# Patient Record
Sex: MALE | Race: BLACK OR AFRICAN AMERICAN | NOT HISPANIC OR LATINO | Employment: FULL TIME | URBAN - METROPOLITAN AREA
[De-identification: names, ages, dates, MRNs, and addresses within clinical notes are randomized per-mention and may not be internally consistent; named-entity substitution may affect disease eponyms.]

---

## 2021-01-19 ENCOUNTER — MEDICAL CORRESPONDENCE (OUTPATIENT)
Dept: HEALTH INFORMATION MANAGEMENT | Facility: CLINIC | Age: 31
End: 2021-01-19

## 2021-01-19 ENCOUNTER — TRANSFERRED RECORDS (OUTPATIENT)
Dept: HEALTH INFORMATION MANAGEMENT | Facility: CLINIC | Age: 31
End: 2021-01-19

## 2021-01-21 ENCOUNTER — MEDICAL CORRESPONDENCE (OUTPATIENT)
Dept: HEALTH INFORMATION MANAGEMENT | Facility: CLINIC | Age: 31
End: 2021-01-21

## 2021-01-21 ENCOUNTER — TRANSCRIBE ORDERS (OUTPATIENT)
Dept: OTHER | Age: 31
End: 2021-01-21

## 2021-01-21 DIAGNOSIS — H10.13 ALLERGIC CONJUNCTIVITIS, BILATERAL: Primary | ICD-10-CM

## 2021-01-21 DIAGNOSIS — H40.053 BORDERLINE GLAUCOMA WITH OCULAR HYPERTENSION, BILATERAL: ICD-10-CM

## 2021-01-22 ENCOUNTER — APPOINTMENT (OUTPATIENT)
Dept: INTERPRETER SERVICES | Facility: CLINIC | Age: 31
End: 2021-01-22

## 2021-03-30 ENCOUNTER — TELEPHONE (OUTPATIENT)
Dept: OPHTHALMOLOGY | Facility: CLINIC | Age: 31
End: 2021-03-30

## 2021-03-30 NOTE — TELEPHONE ENCOUNTER
[phone] home/cell    Left message with  at [phone]    Reviewed not able to use personal email.    Provided clinic address/building name and floor and direct number for questions-- with     Mathew Magallanes RN 1:28 PM 03/30/21          [facility]    Phone Message    May a detailed message be left on voicemail: yes     Reason for Call: Other: Please send an e-mail to the Pt (address updated) with the Clinic Address. Please send ASAP as Appt is tomorrow (03/31/2021). Thanks!     Action Taken: Message routed to:  Other: [facility]    Travel Screening: Not Applicable

## 2021-03-31 ENCOUNTER — OFFICE VISIT (OUTPATIENT)
Dept: OPHTHALMOLOGY | Facility: CLINIC | Age: 31
End: 2021-03-31
Attending: FAMILY MEDICINE
Payer: COMMERCIAL

## 2021-03-31 DIAGNOSIS — H02.59 FLOPPY EYELID SYNDROME OF LEFT EYE: Primary | ICD-10-CM

## 2021-03-31 DIAGNOSIS — H40.053 BORDERLINE GLAUCOMA OF BOTH EYES WITH OCULAR HYPERTENSION: ICD-10-CM

## 2021-03-31 DIAGNOSIS — H40.003 GLAUCOMA SUSPECT OF BOTH EYES: ICD-10-CM

## 2021-03-31 PROCEDURE — 92133 CPTRZD OPH DX IMG PST SGM ON: CPT | Mod: 26 | Performed by: OPHTHALMOLOGY

## 2021-03-31 PROCEDURE — 92133 CPTRZD OPH DX IMG PST SGM ON: CPT | Performed by: STUDENT IN AN ORGANIZED HEALTH CARE EDUCATION/TRAINING PROGRAM

## 2021-03-31 PROCEDURE — 92004 COMPRE OPH EXAM NEW PT 1/>: CPT | Mod: GC | Performed by: OPHTHALMOLOGY

## 2021-03-31 PROCEDURE — G0463 HOSPITAL OUTPT CLINIC VISIT: HCPCS

## 2021-03-31 ASSESSMENT — CONF VISUAL FIELD
METHOD: COUNTING FINGERS
OD_NORMAL: 1
OS_NORMAL: 1

## 2021-03-31 ASSESSMENT — TONOMETRY
OD_IOP_MMHG: 29
OS_IOP_MMHG: 21
IOP_METHOD: APPLANATION

## 2021-03-31 ASSESSMENT — VISUAL ACUITY
OD_SC: J1+
OS_SC: 20/20
OS_SC: J1+
OD_SC: 20/20
METHOD: SNELLEN - LINEAR

## 2021-03-31 ASSESSMENT — PACHYMETRY
OS_CT(UM): 545
OD_CT(UM): 514

## 2021-03-31 ASSESSMENT — EXTERNAL EXAM - LEFT EYE: OS_EXAM: NORMAL

## 2021-03-31 ASSESSMENT — EXTERNAL EXAM - RIGHT EYE: OD_EXAM: NORMAL

## 2021-03-31 ASSESSMENT — CUP TO DISC RATIO
OD_RATIO: 0.7
OS_RATIO: 0.65

## 2021-03-31 NOTE — NURSING NOTE
Chief Complaints and History of Present Illnesses   Patient presents with     Eye Problem Left Eye     Chief Complaint(s) and History of Present Illness(es)     Eye Problem Left Eye     Laterality: left eye    Frequency: constantly    Timing: throughout the day    Course: stable    Associated symptoms: Negative for redness, eye pain, dryness, photophobia, burning and itching    Pain scale: 0/10              Comments     Raised tissue inside LLL for nine months, progressing in size. Denies ocular discomfort.  Pt states he has never worn glasses. Pt states his VA seems unaffected by tissue growth.  HPI very difficult to obtain with  does not seem to understand pt and myself.  Pt states taking an eye drop medication three times a day to LE only.  Pt states he has noted no difference in tissue appearance since starting eye drops.  Marisa Brito, COT COT 2:27 PM 03/31/2021

## 2021-03-31 NOTE — PROGRESS NOTES
HPI     Eye Problem Left Eye     In left eye.  Occurring constantly.  It is worse throughout the day.  Since onset it is stable.  Associated symptoms include Negative for redness, eye pain, dryness, photophobia, burning and itching.  Pain was noted as 0/10.              Comments     Raised tissue inside LLL for nine months, progressing in size. Denies ocular discomfort.  Pt states he has never worn glasses. Pt states his VA seems unaffected by tissue growth.  HPI very difficult to obtain with  does not seem to understand pt and myself.  Pt states taking an eye drop medication three times a day to LE only.  Pt states he has noted no difference in tissue appearance since starting eye drops.  Marisa Brito COT COT 2:27 PM 03/31/2021              Last edited by Marisa Brito COT on 3/31/2021  2:28 PM. (History)        POH: Ocular HTN  GTTs: Currently using a drop TID left eye (does not recall name)  PMH: Negative  FH: Mother with eye condition but does not know what     OCT RNFL (3/31/2021)  OD: , all green  OS: GT 99, all green    Assessment & Plan    1. 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 Sleeps on left side. Exam with lid laxity OS>OD   - Start lubricating gel/ointment QHS   - Artificial tears PRN    2. Glaucoma Suspect OU   - Based on C/D ratio and hx of ocular HTN   - Denies known family hx of glaucoma   - IOP 29/24 today   - Pachy 515/545 (on 3/31/21)   - OCT RNFL (3/31/21) - normal OU   - Discussed with patient today. Will recheck IOP and get baseline VF in 3-4 months. If IOP remains elevated and based on testing could consider starting IOP lowering therapy    Return in about 3 months (around 6/30/2021) for VT only, VF.     Seen and discussed with Dr. Winchester.    Sadiq Robison MD  Ophthalmology Resident, PGY-4    Teaching statement:  Complete documentation of historical and exam elements from today's encounter can be found in the full encounter summary report  (not reduplicated in this progress note). I personally obtained the chief complaint(s) and history of present illness.  I confirmed and edited as necessary the review of systems, past medical/surgical history, family history, social history, and examination findings as documented by others; and I examined the patient myself. I personally reviewed the relevant tests, images, and reports as documented above.     I formulated and edited as necessary the assessment and plan and discussed the findings and management plan with the patient and family.  Lennie Winchester MD  Comprehensive Ophthalmology & Ocular Pathology  Department of Ophthalmology and Visual Neurosciences  [email or web address]  Pager [phone]

## 2022-01-20 ENCOUNTER — HOSPITAL ENCOUNTER (EMERGENCY)
Facility: CLINIC | Age: 32
Discharge: HOME | End: 2022-01-21
Attending: EMERGENCY MEDICINE | Admitting: EMERGENCY MEDICINE
Payer: COMMERCIAL

## 2022-01-20 ENCOUNTER — APPOINTMENT (OUTPATIENT)
Dept: GENERAL RADIOLOGY | Facility: CLINIC | Age: 32
End: 2022-01-20
Attending: EMERGENCY MEDICINE
Payer: COMMERCIAL

## 2022-01-20 DIAGNOSIS — U07.1 INFECTION DUE TO 2019 NOVEL CORONAVIRUS: ICD-10-CM

## 2022-01-20 DIAGNOSIS — R91.8 BILATERAL PULMONARY INFILTRATES ON CHEST X-RAY: ICD-10-CM

## 2022-01-20 DIAGNOSIS — R07.9 CHEST PAIN, UNSPECIFIED TYPE: ICD-10-CM

## 2022-01-20 LAB
FLUAV RNA SPEC QL NAA+PROBE: NEGATIVE
FLUBV RNA RESP QL NAA+PROBE: NEGATIVE
RSV RNA SPEC NAA+PROBE: NEGATIVE
SARS-COV-2 RNA RESP QL NAA+PROBE: POSITIVE

## 2022-01-20 PROCEDURE — 80048 BASIC METABOLIC PNL TOTAL CA: CPT | Performed by: EMERGENCY MEDICINE

## 2022-01-20 PROCEDURE — 85025 COMPLETE CBC W/AUTO DIFF WBC: CPT | Performed by: EMERGENCY MEDICINE

## 2022-01-20 PROCEDURE — 99285 EMERGENCY DEPT VISIT HI MDM: CPT | Mod: 25 | Performed by: EMERGENCY MEDICINE

## 2022-01-20 PROCEDURE — C9803 HOPD COVID-19 SPEC COLLECT: HCPCS

## 2022-01-20 PROCEDURE — 99285 EMERGENCY DEPT VISIT HI MDM: CPT | Mod: 25

## 2022-01-20 PROCEDURE — 87637 SARSCOV2&INF A&B&RSV AMP PRB: CPT | Performed by: EMERGENCY MEDICINE

## 2022-01-20 PROCEDURE — 71045 X-RAY EXAM CHEST 1 VIEW: CPT | Mod: 26 | Performed by: RADIOLOGY

## 2022-01-20 PROCEDURE — 36415 COLL VENOUS BLD VENIPUNCTURE: CPT | Performed by: EMERGENCY MEDICINE

## 2022-01-20 PROCEDURE — 83880 ASSAY OF NATRIURETIC PEPTIDE: CPT | Performed by: EMERGENCY MEDICINE

## 2022-01-20 PROCEDURE — 93010 ELECTROCARDIOGRAM REPORT: CPT | Performed by: EMERGENCY MEDICINE

## 2022-01-20 PROCEDURE — 71045 X-RAY EXAM CHEST 1 VIEW: CPT

## 2022-01-20 PROCEDURE — 93005 ELECTROCARDIOGRAM TRACING: CPT

## 2022-01-20 PROCEDURE — 84484 ASSAY OF TROPONIN QUANT: CPT | Performed by: EMERGENCY MEDICINE

## 2022-01-20 ASSESSMENT — MIFFLIN-ST. JEOR: SCORE: 1786.79

## 2022-01-21 VITALS
OXYGEN SATURATION: 99 % | HEART RATE: 75 BPM | WEIGHT: 175 LBS | RESPIRATION RATE: 20 BRPM | BODY MASS INDEX: 23.7 KG/M2 | DIASTOLIC BLOOD PRESSURE: 85 MMHG | SYSTOLIC BLOOD PRESSURE: 116 MMHG | HEIGHT: 72 IN | TEMPERATURE: 98.7 F

## 2022-01-21 LAB
ANION GAP SERPL CALCULATED.3IONS-SCNC: 5 MMOL/L (ref 3–14)
BASOPHILS # BLD AUTO: 0.1 10E3/UL (ref 0–0.2)
BASOPHILS NFR BLD AUTO: 1 %
BUN SERPL-MCNC: 9 MG/DL (ref 7–30)
CALCIUM SERPL-MCNC: 8.9 MG/DL (ref 8.5–10.1)
CHLORIDE BLD-SCNC: 105 MMOL/L (ref 94–109)
CO2 SERPL-SCNC: 27 MMOL/L (ref 20–32)
CREAT SERPL-MCNC: 0.81 MG/DL (ref 0.66–1.25)
EOSINOPHIL # BLD AUTO: 0.2 10E3/UL (ref 0–0.7)
EOSINOPHIL NFR BLD AUTO: 2 %
ERYTHROCYTE [DISTWIDTH] IN BLOOD BY AUTOMATED COUNT: 12.2 % (ref 10–15)
GFR SERPL CREATININE-BSD FRML MDRD: >90 ML/MIN/1.73M2
GLUCOSE BLD-MCNC: 123 MG/DL (ref 70–99)
HCT VFR BLD AUTO: 38.1 % (ref 40–53)
HGB BLD-MCNC: 13.2 G/DL (ref 13.3–17.7)
HOLD SPECIMEN: NORMAL
HOLD SPECIMEN: NORMAL
IMM GRANULOCYTES # BLD: 0 10E3/UL
IMM GRANULOCYTES NFR BLD: 1 %
LYMPHOCYTES # BLD AUTO: 1.9 10E3/UL (ref 0.8–5.3)
LYMPHOCYTES NFR BLD AUTO: 24 %
MCH RBC QN AUTO: 30.3 PG (ref 26.5–33)
MCHC RBC AUTO-ENTMCNC: 34.6 G/DL (ref 31.5–36.5)
MCV RBC AUTO: 88 FL (ref 78–100)
MONOCYTES # BLD AUTO: 0.7 10E3/UL (ref 0–1.3)
MONOCYTES NFR BLD AUTO: 9 %
NEUTROPHILS # BLD AUTO: 5 10E3/UL (ref 1.6–8.3)
NEUTROPHILS NFR BLD AUTO: 63 %
NRBC # BLD AUTO: 0 10E3/UL
NRBC BLD AUTO-RTO: 0 /100
NT-PROBNP SERPL-MCNC: 14 PG/ML (ref 0–450)
PLATELET # BLD AUTO: 267 10E3/UL (ref 150–450)
POTASSIUM BLD-SCNC: 3.6 MMOL/L (ref 3.4–5.3)
RBC # BLD AUTO: 4.35 10E6/UL (ref 4.4–5.9)
SODIUM SERPL-SCNC: 137 MMOL/L (ref 133–144)
TROPONIN I SERPL HS-MCNC: 5 NG/L
WBC # BLD AUTO: 7.9 10E3/UL (ref 4–11)

## 2022-01-21 PROCEDURE — 250N000013 HC RX MED GY IP 250 OP 250 PS 637: Performed by: EMERGENCY MEDICINE

## 2022-01-21 RX ORDER — ACETAMINOPHEN 325 MG/1
975 TABLET ORAL ONCE
Status: COMPLETED | OUTPATIENT
Start: 2022-01-21 | End: 2022-01-21

## 2022-01-21 RX ORDER — IBUPROFEN 600 MG/1
600 TABLET, FILM COATED ORAL EVERY 8 HOURS PRN
Qty: 20 TABLET | Refills: 0 | Status: SHIPPED | OUTPATIENT
Start: 2022-01-21 | End: 2024-03-04

## 2022-01-21 RX ORDER — IBUPROFEN 600 MG/1
600 TABLET, FILM COATED ORAL ONCE
Status: COMPLETED | OUTPATIENT
Start: 2022-01-21 | End: 2022-01-21

## 2022-01-21 RX ADMIN — IBUPROFEN 600 MG: 600 TABLET ORAL at 00:27

## 2022-01-21 RX ADMIN — ACETAMINOPHEN 975 MG: 325 TABLET, FILM COATED ORAL at 00:27

## 2022-01-21 ASSESSMENT — ENCOUNTER SYMPTOMS
GASTROINTESTINAL NEGATIVE: 1
NEUROLOGICAL NEGATIVE: 1
COUGH: 1
ACTIVITY CHANGE: 1
MYALGIAS: 1
FATIGUE: 1
FEVER: 1

## 2022-01-21 NOTE — ED PROVIDER NOTES
[place] EMERGENCY DEPARTMENT ([facility])  1/20/22  History     Chief Complaint   Patient presents with     Chest Pain     HPI  Jessica Farnsworth is a 32 year old male who presents to the Emergency Department with c/o chest pain and shoulder pain Left sided arm, shoulder, and chest pain Pain started 3 days ago   he has known chronic medical problems.  He is unvaccinated for COVID-19.  He does have a mild cough and general malaise consistent with COVID-19.        Past Medical History  History reviewed. No pertinent past medical history.  History reviewed. No pertinent surgical history.  UNKNOWN TO PATIENT      Allergies   Allergen Reactions     Olopatadine Other (See Comments)     Worsening of conjunctivitis     Family History  History reviewed. No pertinent family history.  Social History   Social History     Tobacco Use     Smoking status: Never Smoker     Smokeless tobacco: Never Used   Substance Use Topics     Alcohol use: Not Currently     Drug use: Not Currently      Past medical history, past surgical history, medications, allergies, family history, and social history were reviewed with the patient. No additional pertinent items.     I have reviewed the Medications, Allergies, Past Medical and Surgical History, and Social History in the Epic system.    Review of Systems   Constitutional: Positive for activity change, fatigue and fever.   HENT: Positive for congestion.    Respiratory: Positive for cough.    Cardiovascular: Positive for chest pain.   Gastrointestinal: Negative.    Genitourinary: Negative.    Musculoskeletal: Positive for myalgias.   Skin: Negative.    Neurological: Negative.    All other systems reviewed and are negative.    A complete review of systems was performed with pertinent positives and negatives noted in the HPI, and all other systems negative.    Physical Exam   BP: 111/82  Pulse: 91  Temp: 98.3  F (36.8  C)  Resp: 18  Height: 182.9 cm (6')  Weight: 79.4 kg (175  lb)  SpO2: 99 %      Physical Exam  Vitals and nursing note reviewed.   Constitutional:       General: He is not in acute distress.     Appearance: He is well-developed.   HENT:      Head: Normocephalic and atraumatic.      Mouth/Throat:      Mouth: Mucous membranes are moist.   Eyes:      General: No scleral icterus.     Conjunctiva/sclera: Conjunctivae normal.      Pupils: Pupils are equal, round, and reactive to light.   Cardiovascular:      Rate and Rhythm: Normal rate and regular rhythm.      Heart sounds: Normal heart sounds.   Pulmonary:      Effort: Pulmonary effort is normal. No respiratory distress.      Breath sounds: Normal breath sounds. No wheezing.   Abdominal:      General: Abdomen is flat.      Palpations: Abdomen is soft.   Musculoskeletal:      Cervical back: Neck supple.   Skin:     General: Skin is warm and dry.   Neurological:      General: No focal deficit present.      Mental Status: He is alert and oriented to person, place, and time.      Cranial Nerves: No cranial nerve deficit.   Psychiatric:         Mood and Affect: Mood normal.         Behavior: Behavior normal.         ED Course     At 10:41 PM the patient was seen and examined by Noah Lopez MD in Room ED 6.        Procedures              EKG Interpretation:      Interpreted by Noah Lopez MD  Time reviewed: 11:00 PM  Symptoms at time of EKG: Chest pain    Rhythm: normal sinus   Rate: normal  Axis: normal  Ectopy: none  Conduction: normal  ST Segments/ T Waves: No ST-T wave changes  Q Waves: none  Comparison to prior: No old EKG available    Clinical Impression: normal EKG        Results for orders placed or performed during the hospital encounter of 01/20/22 (from the past 24 hour(s))   Symptomatic; Unknown Influenza A/B & SARS-CoV2 (COVID-19) Virus PCR Multiplex Nasopharyngeal    Specimen: Nasopharyngeal; Swab   Result Value Ref Range    Influenza A PCR Negative Negative    Influenza B PCR Negative Negative     RSV PCR Negative Negative    SARS CoV2 PCR Positive (A) Negative, Testing sent to reference lab. Results will be returned via unsolicited result    Narrative    Testing was performed using the Xpert Xpress CoV2/Flu/RSV Assay on the Cepheid GeneXpert Instrument. This test should be ordered for the detection of SARS-CoV-2 and influenza viruses in individuals who meet clinical and/or epidemiological criteria. Test performance is unknown in asymptomatic patients. This test is for in vitro diagnostic use under the FDA EUA for laboratories certified under CLIA to perform high or moderate complexity testing. This test has not been FDA cleared or approved. A negative result does not rule out the presence of PCR inhibitors in the specimen or target RNA in concentration below the limit of detection for the assay. If only one viral target is positive but coinfection with multiple targets is suspected, the sample should be re-tested with another FDA cleared, approved, or authorized test, if coinfection would change clinical management. This test was validated by the [facility]. These laboratories are certified under the Clinical  Laboratory Improvement Amendments of 1988 (CLIA-88) as qualified to perform high complexity laboratory testing.   EKG 12-lead, tracing only   Result Value Ref Range    Systolic Blood Pressure  mmHg    Diastolic Blood Pressure  mmHg    Ventricular Rate 92 BPM    Atrial Rate 92 BPM    KS Interval 148 ms    QRS Duration 82 ms     ms    QTc 413 ms    P Axis 61 degrees    R AXIS 28 degrees    T Axis 28 degrees    Interpretation ECG       Sinus rhythm  Possible Left atrial enlargement  Borderline ECG     XR Chest Port 1 View    Narrative    EXAM: XR CHEST PORT 1 VIEW  LOCATION: [facility]  DATE/TIME: 1/20/2022 11:08 PM    INDICATION: CP  COMPARISON: None.      Impression    IMPRESSION: There are infiltrates in both lung bases. Heart  size within normal limits.   [facility] Draw    Narrative    The following orders were created for panel order [facility] Draw.  Procedure                               Abnormality         Status                     ---------                               -----------         ------                     Extra Blue Top Tube[[id number]]                              In process                 Extra Purple Top Tube[[id number]]                            In process                   Please view results for these tests on the individual orders.   CBC with platelets differential    Narrative    The following orders were created for panel order CBC with platelets differential.  Procedure                               Abnormality         Status                     ---------                               -----------         ------                     CBC with platelets and d...[[id number]]                      In process                   Please view results for these tests on the individual orders.   Basic metabolic panel   Result Value Ref Range    Sodium 137 133 - 144 mmol/L    Potassium 3.6 3.4 - 5.3 mmol/L    Chloride 105 94 - 109 mmol/L    Carbon Dioxide (CO2)      Anion Gap      Urea Nitrogen      Creatinine      Calcium      Glucose      GFR Estimate       Medications - No data to display          Assessments & Plan (with Medical Decision Making)   Jessica Farnsworth is a 32 year old male with chest pain.  His chest x-ray shows mild bibasilar infiltrates the pulmonary irritation is likely the source of his discomfort.  He is not hypoxic or febrile.  We will recommend ibuprofen for pain we will enroll him in the get well loop.  He is advised to avoid spread to others.  I recommend he get vaccinated after he clears this illness.  If his oxygen saturation drops below 90 or he becomes short of breath or increased work of breathing he should return to the emergency department.    I have reviewed the nursing notes.    I have reviewed  the findings, diagnosis, plan and need for follow up with the patient.    New Prescriptions    No medications on file       Final diagnoses:   Infection due to 2019 novel coronavirus       IXavier am serving as a trained medical scribe to document services personally performed by Noah Lopez MD based on the provider's statements to me.      Noah REDMOND MD was physically present and have reviewed and verified the accuracy of this note documented by Xavier Meredith.     Naoh Lopez MD  1/20/2022   [facility] EMERGENCY DEPARTMENT     Noah Lopez MD  01/21/22 0039

## 2022-01-21 NOTE — ED TRIAGE NOTES
Pt arrives to triage with wife with complaints of chest pain and L arm pain. Wife reports symptoms started 3 days ago as epigastric pain and it has progressively gotten worse. Vitals stable in triage, no known medical history.

## 2022-01-21 NOTE — DISCHARGE INSTRUCTIONS
You have a COVID infection with mild pneumonia.  Avoid spread to others  When you recover get COVID vaccinated  If you develop difficulty breathing or oxygen saturations less than 90% return to the emergency department

## 2022-01-22 LAB
ATRIAL RATE - MUSE: 92 BPM
DIASTOLIC BLOOD PRESSURE - MUSE: NORMAL MMHG
INTERPRETATION ECG - MUSE: NORMAL
P AXIS - MUSE: 61 DEGREES
PR INTERVAL - MUSE: 148 MS
QRS DURATION - MUSE: 82 MS
QT - MUSE: 334 MS
QTC - MUSE: 413 MS
R AXIS - MUSE: 28 DEGREES
SYSTOLIC BLOOD PRESSURE - MUSE: NORMAL MMHG
T AXIS - MUSE: 28 DEGREES
VENTRICULAR RATE- MUSE: 92 BPM

## 2022-01-30 PROCEDURE — 96374 THER/PROPH/DIAG INJ IV PUSH: CPT | Mod: 59 | Performed by: EMERGENCY MEDICINE

## 2022-01-30 PROCEDURE — 99285 EMERGENCY DEPT VISIT HI MDM: CPT | Mod: 25 | Performed by: EMERGENCY MEDICINE

## 2022-01-30 PROCEDURE — 99285 EMERGENCY DEPT VISIT HI MDM: CPT | Performed by: EMERGENCY MEDICINE

## 2022-01-31 ENCOUNTER — APPOINTMENT (OUTPATIENT)
Dept: CARDIOLOGY | Facility: CLINIC | Age: 32
End: 2022-01-31
Attending: STUDENT IN AN ORGANIZED HEALTH CARE EDUCATION/TRAINING PROGRAM
Payer: COMMERCIAL

## 2022-01-31 ENCOUNTER — APPOINTMENT (OUTPATIENT)
Dept: CT IMAGING | Facility: CLINIC | Age: 32
End: 2022-01-31
Attending: EMERGENCY MEDICINE
Payer: COMMERCIAL

## 2022-01-31 ENCOUNTER — HOSPITAL ENCOUNTER (INPATIENT)
Facility: CLINIC | Age: 32
LOS: 4 days | Discharge: HOME | End: 2022-02-04
Attending: EMERGENCY MEDICINE | Admitting: HOSPITALIST
Payer: COMMERCIAL

## 2022-01-31 ENCOUNTER — APPOINTMENT (OUTPATIENT)
Dept: GENERAL RADIOLOGY | Facility: CLINIC | Age: 32
End: 2022-01-31
Attending: EMERGENCY MEDICINE
Payer: COMMERCIAL

## 2022-01-31 DIAGNOSIS — R91.8 PULMONARY NODULES: ICD-10-CM

## 2022-01-31 DIAGNOSIS — M25.551 HIP PAIN, RIGHT: ICD-10-CM

## 2022-01-31 DIAGNOSIS — R07.9 CHEST PAIN, UNSPECIFIED TYPE: ICD-10-CM

## 2022-01-31 LAB
ALBUMIN SERPL-MCNC: 4 G/DL (ref 3.4–5)
ALP SERPL-CCNC: 76 U/L (ref 40–150)
ALT SERPL W P-5'-P-CCNC: 47 U/L (ref 0–70)
ANION GAP SERPL CALCULATED.3IONS-SCNC: 3 MMOL/L (ref 3–14)
AST SERPL W P-5'-P-CCNC: 20 U/L (ref 0–45)
BASOPHILS # BLD AUTO: 0.1 10E3/UL (ref 0–0.2)
BASOPHILS NFR BLD AUTO: 1 %
BILIRUB SERPL-MCNC: 0.3 MG/DL (ref 0.2–1.3)
BUN SERPL-MCNC: 9 MG/DL (ref 7–30)
CALCIUM SERPL-MCNC: 9.4 MG/DL (ref 8.5–10.1)
CHLORIDE BLD-SCNC: 105 MMOL/L (ref 94–109)
CO2 SERPL-SCNC: 27 MMOL/L (ref 20–32)
CREAT SERPL-MCNC: 0.8 MG/DL (ref 0.66–1.25)
CRP SERPL-MCNC: 11 MG/L (ref 0–8)
CRP SERPL-MCNC: 13 MG/L (ref 0–8)
EOSINOPHIL # BLD AUTO: 0.3 10E3/UL (ref 0–0.7)
EOSINOPHIL NFR BLD AUTO: 4 %
ERYTHROCYTE [DISTWIDTH] IN BLOOD BY AUTOMATED COUNT: 12.7 % (ref 10–15)
ERYTHROCYTE [SEDIMENTATION RATE] IN BLOOD BY WESTERGREN METHOD: 20 MM/HR (ref 0–15)
ERYTHROCYTE [SEDIMENTATION RATE] IN BLOOD BY WESTERGREN METHOD: 28 MM/HR (ref 0–15)
GFR SERPL CREATININE-BSD FRML MDRD: >90 ML/MIN/1.73M2
GLUCOSE BLD-MCNC: 95 MG/DL (ref 70–99)
HCT VFR BLD AUTO: 43.7 % (ref 40–53)
HGB BLD-MCNC: 14.9 G/DL (ref 13.3–17.7)
IMM GRANULOCYTES # BLD: 0 10E3/UL
IMM GRANULOCYTES NFR BLD: 0 %
LACTATE SERPL-SCNC: 0.6 MMOL/L (ref 0.7–2)
LVEF ECHO: NORMAL
LYMPHOCYTES # BLD AUTO: 2.6 10E3/UL (ref 0.8–5.3)
LYMPHOCYTES NFR BLD AUTO: 36 %
MCH RBC QN AUTO: 29.9 PG (ref 26.5–33)
MCHC RBC AUTO-ENTMCNC: 34.1 G/DL (ref 31.5–36.5)
MCV RBC AUTO: 88 FL (ref 78–100)
MONOCYTES # BLD AUTO: 0.4 10E3/UL (ref 0–1.3)
MONOCYTES NFR BLD AUTO: 6 %
NEUTROPHILS # BLD AUTO: 3.7 10E3/UL (ref 1.6–8.3)
NEUTROPHILS NFR BLD AUTO: 53 %
NRBC # BLD AUTO: 0 10E3/UL
NRBC BLD AUTO-RTO: 0 /100
PLATELET # BLD AUTO: 278 10E3/UL (ref 150–450)
POTASSIUM BLD-SCNC: 3.8 MMOL/L (ref 3.4–5.3)
PROCALCITONIN SERPL-MCNC: <0.05 NG/ML
PROT SERPL-MCNC: 8.8 G/DL (ref 6.8–8.8)
RBC # BLD AUTO: 4.99 10E6/UL (ref 4.4–5.9)
SODIUM SERPL-SCNC: 135 MMOL/L (ref 133–144)
TROPONIN I SERPL HS-MCNC: 4 NG/L
WBC # BLD AUTO: 7 10E3/UL (ref 4–11)

## 2022-01-31 PROCEDURE — 84145 PROCALCITONIN (PCT): CPT | Performed by: STUDENT IN AN ORGANIZED HEALTH CARE EDUCATION/TRAINING PROGRAM

## 2022-01-31 PROCEDURE — 86036 ANCA SCREEN EACH ANTIBODY: CPT | Performed by: STUDENT IN AN ORGANIZED HEALTH CARE EDUCATION/TRAINING PROGRAM

## 2022-01-31 PROCEDURE — 87158 CULTURE TYPING ADDED METHOD: CPT | Performed by: STUDENT IN AN ORGANIZED HEALTH CARE EDUCATION/TRAINING PROGRAM

## 2022-01-31 PROCEDURE — 87206 SMEAR FLUORESCENT/ACID STAI: CPT | Performed by: STUDENT IN AN ORGANIZED HEALTH CARE EDUCATION/TRAINING PROGRAM

## 2022-01-31 PROCEDURE — 250N000013 HC RX MED GY IP 250 OP 250 PS 637: Performed by: PEDIATRICS

## 2022-01-31 PROCEDURE — 250N000013 HC RX MED GY IP 250 OP 250 PS 637: Performed by: STUDENT IN AN ORGANIZED HEALTH CARE EDUCATION/TRAINING PROGRAM

## 2022-01-31 PROCEDURE — 87040 BLOOD CULTURE FOR BACTERIA: CPT | Performed by: EMERGENCY MEDICINE

## 2022-01-31 PROCEDURE — 87899 AGENT NOS ASSAY W/OPTIC: CPT | Performed by: STUDENT IN AN ORGANIZED HEALTH CARE EDUCATION/TRAINING PROGRAM

## 2022-01-31 PROCEDURE — 71275 CT ANGIOGRAPHY CHEST: CPT | Mod: 26 | Performed by: RADIOLOGY

## 2022-01-31 PROCEDURE — 73502 X-RAY EXAM HIP UNI 2-3 VIEWS: CPT

## 2022-01-31 PROCEDURE — 87205 SMEAR GRAM STAIN: CPT | Performed by: STUDENT IN AN ORGANIZED HEALTH CARE EDUCATION/TRAINING PROGRAM

## 2022-01-31 PROCEDURE — 87102 FUNGUS ISOLATION CULTURE: CPT | Performed by: STUDENT IN AN ORGANIZED HEALTH CARE EDUCATION/TRAINING PROGRAM

## 2022-01-31 PROCEDURE — 250N000013 HC RX MED GY IP 250 OP 250 PS 637: Performed by: EMERGENCY MEDICINE

## 2022-01-31 PROCEDURE — 99223 1ST HOSP IP/OBS HIGH 75: CPT | Mod: AI | Performed by: STUDENT IN AN ORGANIZED HEALTH CARE EDUCATION/TRAINING PROGRAM

## 2022-01-31 PROCEDURE — 85025 COMPLETE CBC W/AUTO DIFF WBC: CPT | Performed by: EMERGENCY MEDICINE

## 2022-01-31 PROCEDURE — 86038 ANTINUCLEAR ANTIBODIES: CPT | Performed by: STUDENT IN AN ORGANIZED HEALTH CARE EDUCATION/TRAINING PROGRAM

## 2022-01-31 PROCEDURE — 250N000011 HC RX IP 250 OP 636: Performed by: EMERGENCY MEDICINE

## 2022-01-31 PROCEDURE — 258N000003 HC RX IP 258 OP 636: Performed by: STUDENT IN AN ORGANIZED HEALTH CARE EDUCATION/TRAINING PROGRAM

## 2022-01-31 PROCEDURE — 93306 TTE W/DOPPLER COMPLETE: CPT

## 2022-01-31 PROCEDURE — 36415 COLL VENOUS BLD VENIPUNCTURE: CPT | Performed by: EMERGENCY MEDICINE

## 2022-01-31 PROCEDURE — 85652 RBC SED RATE AUTOMATED: CPT | Performed by: EMERGENCY MEDICINE

## 2022-01-31 PROCEDURE — 87449 NOS EACH ORGANISM AG IA: CPT | Performed by: STUDENT IN AN ORGANIZED HEALTH CARE EDUCATION/TRAINING PROGRAM

## 2022-01-31 PROCEDURE — 36415 COLL VENOUS BLD VENIPUNCTURE: CPT | Performed by: STUDENT IN AN ORGANIZED HEALTH CARE EDUCATION/TRAINING PROGRAM

## 2022-01-31 PROCEDURE — 80053 COMPREHEN METABOLIC PANEL: CPT | Performed by: EMERGENCY MEDICINE

## 2022-01-31 PROCEDURE — 87040 BLOOD CULTURE FOR BACTERIA: CPT | Performed by: STUDENT IN AN ORGANIZED HEALTH CARE EDUCATION/TRAINING PROGRAM

## 2022-01-31 PROCEDURE — 85652 RBC SED RATE AUTOMATED: CPT | Performed by: STUDENT IN AN ORGANIZED HEALTH CARE EDUCATION/TRAINING PROGRAM

## 2022-01-31 PROCEDURE — 120N000002 HC R&B MED SURG/OB UMMC

## 2022-01-31 PROCEDURE — 250N000011 HC RX IP 250 OP 636: Performed by: STUDENT IN AN ORGANIZED HEALTH CARE EDUCATION/TRAINING PROGRAM

## 2022-01-31 PROCEDURE — 87186 SC STD MICRODIL/AGAR DIL: CPT

## 2022-01-31 PROCEDURE — 87389 HIV-1 AG W/HIV-1&-2 AB AG IA: CPT | Performed by: STUDENT IN AN ORGANIZED HEALTH CARE EDUCATION/TRAINING PROGRAM

## 2022-01-31 PROCEDURE — 84999 UNLISTED CHEMISTRY PROCEDURE: CPT

## 2022-01-31 PROCEDURE — 83605 ASSAY OF LACTIC ACID: CPT | Performed by: EMERGENCY MEDICINE

## 2022-01-31 PROCEDURE — 86140 C-REACTIVE PROTEIN: CPT | Performed by: STUDENT IN AN ORGANIZED HEALTH CARE EDUCATION/TRAINING PROGRAM

## 2022-01-31 PROCEDURE — 87188 SC STD MACROBROTH DIL METH: CPT | Performed by: STUDENT IN AN ORGANIZED HEALTH CARE EDUCATION/TRAINING PROGRAM

## 2022-01-31 PROCEDURE — 87106 FUNGI IDENTIFICATION YEAST: CPT | Performed by: STUDENT IN AN ORGANIZED HEALTH CARE EDUCATION/TRAINING PROGRAM

## 2022-01-31 PROCEDURE — 87305 ASPERGILLUS AG IA: CPT | Performed by: STUDENT IN AN ORGANIZED HEALTH CARE EDUCATION/TRAINING PROGRAM

## 2022-01-31 PROCEDURE — 93306 TTE W/DOPPLER COMPLETE: CPT | Mod: 26 | Performed by: STUDENT IN AN ORGANIZED HEALTH CARE EDUCATION/TRAINING PROGRAM

## 2022-01-31 PROCEDURE — 86780 TREPONEMA PALLIDUM: CPT | Performed by: STUDENT IN AN ORGANIZED HEALTH CARE EDUCATION/TRAINING PROGRAM

## 2022-01-31 PROCEDURE — 74177 CT ABD & PELVIS W/CONTRAST: CPT | Mod: 26 | Performed by: RADIOLOGY

## 2022-01-31 PROCEDURE — 86481 TB AG RESPONSE T-CELL SUSP: CPT | Performed by: EMERGENCY MEDICINE

## 2022-01-31 PROCEDURE — 99221 1ST HOSP IP/OBS SF/LOW 40: CPT | Mod: GC | Performed by: INTERNAL MEDICINE

## 2022-01-31 PROCEDURE — 86140 C-REACTIVE PROTEIN: CPT | Performed by: EMERGENCY MEDICINE

## 2022-01-31 PROCEDURE — 73502 X-RAY EXAM HIP UNI 2-3 VIEWS: CPT | Mod: 26 | Performed by: RADIOLOGY

## 2022-01-31 PROCEDURE — 99221 1ST HOSP IP/OBS SF/LOW 40: CPT | Mod: GC | Performed by: DERMATOLOGY

## 2022-01-31 PROCEDURE — 87385 HISTOPLASMA CAPSUL AG IA: CPT | Performed by: STUDENT IN AN ORGANIZED HEALTH CARE EDUCATION/TRAINING PROGRAM

## 2022-01-31 PROCEDURE — 71275 CT ANGIOGRAPHY CHEST: CPT

## 2022-01-31 PROCEDURE — 250N000009 HC RX 250: Performed by: EMERGENCY MEDICINE

## 2022-01-31 PROCEDURE — 84484 ASSAY OF TROPONIN QUANT: CPT | Performed by: EMERGENCY MEDICINE

## 2022-01-31 PROCEDURE — 74177 CT ABD & PELVIS W/CONTRAST: CPT

## 2022-01-31 RX ORDER — PROCHLORPERAZINE MALEATE 5 MG
10 TABLET ORAL EVERY 6 HOURS PRN
Status: DISCONTINUED | OUTPATIENT
Start: 2022-01-31 | End: 2022-02-04 | Stop reason: HOSPADM

## 2022-01-31 RX ORDER — IBUPROFEN 200 MG
200 TABLET ORAL EVERY 6 HOURS PRN
Status: DISCONTINUED | OUTPATIENT
Start: 2022-01-31 | End: 2022-02-03

## 2022-01-31 RX ORDER — ONDANSETRON 2 MG/ML
4 INJECTION INTRAMUSCULAR; INTRAVENOUS EVERY 6 HOURS PRN
Status: DISCONTINUED | OUTPATIENT
Start: 2022-01-31 | End: 2022-02-04 | Stop reason: HOSPADM

## 2022-01-31 RX ORDER — AMPICILLIN AND SULBACTAM 2; 1 G/1; G/1
3 INJECTION, POWDER, FOR SOLUTION INTRAMUSCULAR; INTRAVENOUS EVERY 6 HOURS
Status: DISCONTINUED | OUTPATIENT
Start: 2022-01-31 | End: 2022-02-03

## 2022-01-31 RX ORDER — ACETAMINOPHEN 500 MG
1000 TABLET ORAL ONCE
Status: COMPLETED | OUTPATIENT
Start: 2022-01-31 | End: 2022-01-31

## 2022-01-31 RX ORDER — LIDOCAINE 40 MG/G
CREAM TOPICAL
Status: DISCONTINUED | OUTPATIENT
Start: 2022-01-31 | End: 2022-02-04 | Stop reason: HOSPADM

## 2022-01-31 RX ORDER — CLINDAMYCIN PHOSPHATE 10 UG/ML
LOTION TOPICAL 2 TIMES DAILY
Status: DISCONTINUED | OUTPATIENT
Start: 2022-01-31 | End: 2022-02-04 | Stop reason: HOSPADM

## 2022-01-31 RX ORDER — PROCHLORPERAZINE 25 MG
25 SUPPOSITORY, RECTAL RECTAL EVERY 12 HOURS PRN
Status: DISCONTINUED | OUTPATIENT
Start: 2022-01-31 | End: 2022-02-04 | Stop reason: HOSPADM

## 2022-01-31 RX ORDER — IOPAMIDOL 755 MG/ML
107 INJECTION, SOLUTION INTRAVASCULAR ONCE
Status: COMPLETED | OUTPATIENT
Start: 2022-01-31 | End: 2022-01-31

## 2022-01-31 RX ORDER — KETOROLAC TROMETHAMINE 30 MG/ML
30 INJECTION, SOLUTION INTRAMUSCULAR; INTRAVENOUS ONCE
Status: COMPLETED | OUTPATIENT
Start: 2022-01-31 | End: 2022-01-31

## 2022-01-31 RX ORDER — BENZOYL PEROXIDE 10 G/100G
GEL TOPICAL AT BEDTIME
Status: DISCONTINUED | OUTPATIENT
Start: 2022-01-31 | End: 2022-01-31

## 2022-01-31 RX ORDER — BENZOYL PEROXIDE 5 G/100G
GEL TOPICAL AT BEDTIME
Status: DISCONTINUED | OUTPATIENT
Start: 2022-01-31 | End: 2022-02-04 | Stop reason: HOSPADM

## 2022-01-31 RX ORDER — ACETAMINOPHEN 325 MG/1
975 TABLET ORAL EVERY 8 HOURS PRN
Status: DISCONTINUED | OUTPATIENT
Start: 2022-01-31 | End: 2022-02-04 | Stop reason: HOSPADM

## 2022-01-31 RX ORDER — ONDANSETRON 4 MG/1
4 TABLET, ORALLY DISINTEGRATING ORAL EVERY 6 HOURS PRN
Status: DISCONTINUED | OUTPATIENT
Start: 2022-01-31 | End: 2022-02-04 | Stop reason: HOSPADM

## 2022-01-31 RX ORDER — POLYETHYLENE GLYCOL 3350 17 G/17G
17 POWDER, FOR SOLUTION ORAL DAILY PRN
Status: DISCONTINUED | OUTPATIENT
Start: 2022-01-31 | End: 2022-02-04 | Stop reason: HOSPADM

## 2022-01-31 RX ADMIN — SODIUM CHLORIDE, PRESERVATIVE FREE 90 ML: 5 INJECTION INTRAVENOUS at 03:11

## 2022-01-31 RX ADMIN — AMPICILLIN SODIUM AND SULBACTAM SODIUM 3 G: 2; 1 INJECTION, POWDER, FOR SOLUTION INTRAMUSCULAR; INTRAVENOUS at 16:26

## 2022-01-31 RX ADMIN — AMPICILLIN SODIUM AND SULBACTAM SODIUM 3 G: 2; 1 INJECTION, POWDER, FOR SOLUTION INTRAMUSCULAR; INTRAVENOUS at 20:12

## 2022-01-31 RX ADMIN — IOPAMIDOL 107 ML: 755 INJECTION, SOLUTION INTRAVENOUS at 03:11

## 2022-01-31 RX ADMIN — CLINDAMYCIN PHOSPHATE: 10 LOTION TOPICAL at 20:12

## 2022-01-31 RX ADMIN — KETOROLAC TROMETHAMINE 30 MG: 30 INJECTION, SOLUTION INTRAMUSCULAR at 04:29

## 2022-01-31 RX ADMIN — BENZOYL PEROXIDE: 50 GEL TOPICAL at 21:58

## 2022-01-31 RX ADMIN — AZITHROMYCIN MONOHYDRATE 500 MG: 500 INJECTION, POWDER, LYOPHILIZED, FOR SOLUTION INTRAVENOUS at 17:08

## 2022-01-31 RX ADMIN — ACETAMINOPHEN 975 MG: 325 TABLET, FILM COATED ORAL at 22:38

## 2022-01-31 RX ADMIN — ACETAMINOPHEN 1000 MG: 500 TABLET ORAL at 04:28

## 2022-01-31 ASSESSMENT — ACTIVITIES OF DAILY LIVING (ADL)
ADLS_ACUITY_SCORE: 5
ADLS_ACUITY_SCORE: 7
ADLS_ACUITY_SCORE: 3
ADLS_ACUITY_SCORE: 7
ADLS_ACUITY_SCORE: 7
DRESSING/BATHING_DIFFICULTY: NO
DIFFICULTY_COMMUNICATING: NO
ADLS_ACUITY_SCORE: 7
ADLS_ACUITY_SCORE: 5
ADLS_ACUITY_SCORE: 7
ADLS_ACUITY_SCORE: 5
ADLS_ACUITY_SCORE: 7
ADLS_ACUITY_SCORE: 7
ADLS_ACUITY_SCORE: 3
DIFFICULTY_EATING/SWALLOWING: NO
ADLS_ACUITY_SCORE: 7
ADLS_ACUITY_SCORE: 7
TOILETING_ISSUES: NO
ADLS_ACUITY_SCORE: 7
ADLS_ACUITY_SCORE: 7

## 2022-01-31 NOTE — PROGRESS NOTES
Physician Attestation   I, Amrita Bagley, was present with the medical/IDALIA student who participated in the service and in the documentation of the note.  I have verified the history and personally performed the physical exam and medical decision making.  I agree with the assessment and plan of care as documented in the note.      I personally reviewed vital signs, medications, labs and imaging.    Admitted overnight for concern for infection after covid infection. Laying comfortably in bed. Chest pain still present although better compared to admission. Hungry, no changes in stool. Is hoping to talk to pulmonary team. Feeling much more weak.     Amrita Bagley MD  Date of Service (when I saw the patient): 01/31/22    [facility]    Progress Note - Hospitalist Service, GOLD TEAM 11       Date of Admission:  1/31/2022    Assessment & Plan     Jessica Farnsworth is a 32 year old male w h/o latent TB and h/o H pylori (2018) who was admitted for left upper chest pain on 1/31/2022 with nodule and hilar adenopathy noted on CT c/f infection.     Changes today:  - Consult pulmonology   - AFB: pending   - Quantiferon pending   - Airborne precautions for TB rule out   - Holding NSAIDS overnight due to possible biopsy in AM  - Derm consult for skin biopsy of rash.     Left upper lobe pulmonary nodule  Left hilar lymphadenopathy  Recent confirmed COVID-19 infection (1/20)   History of latent tuberculosis, treated (2017)  Pleuritic chest pain  Pt w known h/o latent TB tx in 2017, immigrant from [place]. Presented to ED with upper left chest pain, intermittently pleuritic and radiating to the back. Imaging s/f new L hilar LAD with a LISSET solid pulmonary nodule that appears tree-in-bud like. Ddx: infectious (bacterial vs viral in setting of COVID + 1/20/22) vs sarcoidosis vs malignancy vs lymphoma. WBC wnl, not suggestive of occult bacterial infection. Elevated ESR and mildly  elevated CRP. LA wnl. Unlikely TB given h/o tx completion but test pending here - does have recent exposure to activ TB. CTPE and trop negative.   TTE WNL.    - Pulmonary consulted, appreciate assistance.    - Pending labs: procal, strep pneumo, legionella, histo, blasto, galactomannan, fungitell, sputum, HIV, RPR, SELINA, ANCA   - Possible EBUS on 2/1.    - NPO at midnight    - Airborne precautions for TB rule out  - BCX: pending     Confirmed COVID-19 infection    Recovered COVID      Symptom Onset unknown    Date of 1st Positive Test 1/20/2022   Vaccination Status Partially Vaccinated       - COVID-19 special precautions, continuous pulse-ox  - Oxygen: continue current support with RA; titrate to keep SpO2 between 90-96%  - Labs: Clinically improving, will check labs as needed.   - Imaging: no additional imaging needed at this time  - Breathing treatments: no inhalers needed; avoid nebulizers in favor of MDIs   - IV fluids: not indicated at this time  - Antibiotics: not indicated   - COVID-Focused Medications: No COVID-specific therapies are appropriate at this time.  - DVT Prophylaxis: at high risk of thrombotic complications due to COVID-19 (DDimer = N/A ).          - not indicated        - consider anticoag on discharge for 30 days & until return to normal mobility     Right hip pain, likely MSK - tendonitis vs bursitis  Patient noted radiating right hip pain on initial exam in ED. Labs reassuring against septic joint. XR w/o fracture. On exam, normal ROM with mild pain.    No fracture on plain film. Normal range of motion, though mildly painful. On re-exam, pain much improved. Will continue to monitor.     Diet: Regular Diet Adult    DVT Prophylaxis: Ambulate every shift  Diaz Catheter: Not present  Fluids: N/A   Central Lines: None  Cardiac Monitoring: None  Code Status: Full Code      Disposition Plan   Expected Discharge: 02/04/2022   Anticipated discharge location:  Awaiting care coordination  huddle  Delays:            The patient's care was discussed with the Attending Physician, Dr. Bagley, Bedside Nurse, Patient and pulmonology Consultant.    Laurel Swain  Medical Student  Hospitalist Service, GOLD TEAM 11  [facility]  Securely message with the Vocera Web Console (learn more here)  Text page via [facility] Paging/Directory   Please see signed in provider for up to date coverage information      Clinically Significant Risk Factors Present on Admission                 ______________________________________________________________________    Interval History   Nursing and staff notes reviewed. No acute events since admission. Breathing well on room air, still having intermittent chest pain - improved with toradol. SO at bedside. No abdominal pain, is hungry.     Data reviewed today: I reviewed all medications, new labs and imaging results over the last 24 hours. I personally reviewed the chest CT image(s) showing new hilar LAD and LISSET pulmonary nodule. Labs and Imaging reviewed in Epic, pertinent discussed in A&P.       Physical Exam   Vital Signs: Temp: 97.8  F (36.6  C)   BP: 108/79 Pulse: 52   Resp: 16 SpO2: 99 % O2 Device: None (Room air)    Weight: 175 lbs 0 oz  Physical Exam  Constitutional:       General: He is not in acute distress.     Appearance: He is not ill-appearing.   HENT:      Head: Normocephalic and atraumatic.   Eyes:      Extraocular Movements: Extraocular movements intact.      Conjunctiva/sclera: Conjunctivae normal.   Cardiovascular:      Rate and Rhythm: Normal rate and regular rhythm.      Heart sounds: Normal heart sounds. No murmur heard.      Pulmonary:      Effort: Pulmonary effort is normal.      Breath sounds: Normal breath sounds.   Abdominal:      General: Abdomen is flat. Bowel sounds are normal.      Palpations: Abdomen is soft.   Skin:     General: Skin is warm.   Neurological:      General: No focal deficit present.       Mental Status: He is alert.       Data   Recent Labs   Lab 01/31/22  0111   WBC 7.0   HGB 14.9   MCV 88         POTASSIUM 3.8   CHLORIDE 105   CO2 27   BUN 9   CR 0.80   ANIONGAP 3   RADHA 9.4   GLC 95   ALBUMIN 4.0   PROTTOTAL 8.8   BILITOTAL 0.3   ALKPHOS 76   ALT 47   AST 20     Recent Results (from the past 24 hour(s))   XR Hip Right 2-3 Views    Narrative    EXAM: XR HIP RIGHT 2-3 VIEWS  LOCATION: [facility]  DATE/TIME: 1/31/2022 2:15 AM    INDICATION: Right hip pain  COMPARISON: None.      Impression    IMPRESSION: Normal joint spaces and alignment. No fracture.    CT Chest Pulmonary Embolism w Contrast    Narrative    EXAM: CT CHEST PULMONARY EMBOLISM W CONTRAST, CT ABDOMEN PELVIS W CONTRAST  LOCATION: [facility]  DATE/TIME: 1/31/2022 2:58 AM    INDICATION: PE suspected, high prob.  COMPARISON: CT chest dated 11/14/2018.  TECHNIQUE: CT chest pulmonary angiogram during arterial phase injection of IV contrast. CT abdomen and pelvis were performed during the portal venous phase. Multiplanar reformats and MIP reconstructions were performed. Dose reduction techniques were   used.   CONTRAST: 107 mL Isovue 370.     FINDINGS:  ANGIOGRAM CHEST: Pulmonary arteries are normal caliber and negative for pulmonary emboli. Thoracic aorta is negative for dissection. No CT evidence of right heart strain.    LUNGS AND PLEURA: 5.8 x 4.5 mm solid nodular density in the left upper lobe (5, 26) that appears to extend along the bronchovascular margins. No pneumothorax or pleural effusion.    MEDIASTINUM/AXILLAE: Interval development of 3.7 x 2.4 cm left hilar lymphadenopathy.    CORONARY ARTERY CALCIFICATION: None.    ABDOMEN: Normal liver, gallbladder, spleen, adrenals, and pancreas. Normal kidneys. No obstructing renal or ureteral stones. No free air or fluid. No retroperitoneal or mesenteric lymphadenopathy.    PELVIS: Normal  appendix. No obstruction, colitis, or diverticulitis. No free air or fluid. Normal bladder.    MUSCULOSKELETAL: Mild multilevel discogenic degenerative change. No joint effusions. No radiographic evidence of osteomyelitis.      Impression    IMPRESSION:  1.  No pulmonary embolus, aortic dissection, or aneurysm.  2.  New 3.7 x 2.4 cm left hilar lymphadenopathy with a 0.6 x 0.5 mm left upper lobe solid pulmonary nodule that appears tree-in-bud like. Diagnostic considerations include infectious, inflammatory, and malignant etiologies to include tuberculosis.   Recommend follow-up to resolution.    Findings were discussed with Dr. Hopkins at 3:46 AM on 01/31/2021.    Imaging features are atypical or uncommonly reported for (COVID-19) pneumonia. Alternative diagnoses should be considered.   CT Abdomen Pelvis w Contrast    Narrative    EXAM: CT CHEST PULMONARY EMBOLISM W CONTRAST, CT ABDOMEN PELVIS W CONTRAST  LOCATION: [facility]  DATE/TIME: 1/31/2022 2:58 AM    INDICATION: PE suspected, high prob.  COMPARISON: CT chest dated 11/14/2018.  TECHNIQUE: CT chest pulmonary angiogram during arterial phase injection of IV contrast. CT abdomen and pelvis were performed during the portal venous phase. Multiplanar reformats and MIP reconstructions were performed. Dose reduction techniques were   used.   CONTRAST: 107 mL Isovue 370.     FINDINGS:  ANGIOGRAM CHEST: Pulmonary arteries are normal caliber and negative for pulmonary emboli. Thoracic aorta is negative for dissection. No CT evidence of right heart strain.    LUNGS AND PLEURA: 5.8 x 4.5 mm solid nodular density in the left upper lobe (5, 26) that appears to extend along the bronchovascular margins. No pneumothorax or pleural effusion.    MEDIASTINUM/AXILLAE: Interval development of 3.7 x 2.4 cm left hilar lymphadenopathy.    CORONARY ARTERY CALCIFICATION: None.    ABDOMEN: Normal liver, gallbladder, spleen, adrenals, and  pancreas. Normal kidneys. No obstructing renal or ureteral stones. No free air or fluid. No retroperitoneal or mesenteric lymphadenopathy.    PELVIS: Normal appendix. No obstruction, colitis, or diverticulitis. No free air or fluid. Normal bladder.    MUSCULOSKELETAL: Mild multilevel discogenic degenerative change. No joint effusions. No radiographic evidence of osteomyelitis.      Impression    IMPRESSION:  1.  No pulmonary embolus, aortic dissection, or aneurysm.  2.  New 3.7 x 2.4 cm left hilar lymphadenopathy with a 0.6 x 0.5 mm left upper lobe solid pulmonary nodule that appears tree-in-bud like. Diagnostic considerations include infectious, inflammatory, and malignant etiologies to include tuberculosis.   Recommend follow-up to resolution.    Findings were discussed with Dr. Hopkins at 3:46 AM on 2021.    Imaging features are atypical or uncommonly reported for (COVID-19) pneumonia. Alternative diagnoses should be considered.   Echocardiogram Complete   Result Value    LVEF  55-60%    Narrative    [id number]  [id number]  [id number]  [id number]^[email or web address]^[email or web address]^AMANDEEP     [facility]  Echocardiography Laboratory  [address] [place], [place] [address]     Name: SOM BEY  MRN: [id number]  : 1990  Study Date: 2022 09:40 AM  Age: 32 yrs  Gender: Male  Patient Location: [facility]  Reason For Study: Endocarditis  Ordering Physician: A[place]RIA NESS  Performed By: Alix Andrade     BSA: 2.0 m2  Height: 72 in  Weight: 175 lb  ______________________________________________________________________________  Procedure  Complete Portable Echo Adult. Echocardiogram with two-dimensional, color and  spectral Doppler performed.  ______________________________________________________________________________  Interpretation Summary  No significant valvular abnormalities were noted. No vegetation or mass  identified, however this does not exclude  endocarditis.  ______________________________________________________________________________  Left Ventricle  Global and regional left ventricular function is normal with an EF of 55-60%.  Left ventricular size is normal. Left ventricular diastolic function is  normal.     Right Ventricle  Right ventricular function, chamber size, wall motion, and thickness are  normal.     Atria  Both atria appear normal.     Mitral Valve  The mitral valve is normal.     Aortic Valve  Aortic valve is normal in structure and function. The aortic valve is  tricuspid.     Tricuspid Valve  The tricuspid valve is normal. Mild tricuspid insufficiency is present. The  right ventricular systolic pressure is approximated at 21.5 mmHg plus the  right atrial pressure. Pulmonary artery systolic pressure is normal.     Pulmonic Valve  The pulmonic valve is normal. Trace pulmonic insufficiency is present.     Vessels  The aorta root is normal. The thoracic aorta is normal. The pulmonary artery  cannot be assessed. The inferior vena cava was normal in size with preserved  respiratory variability.     Pericardium  No pericardial effusion is present.     Compared to Previous Study  Previous study not available for comparison.  ______________________________________________________________________________  MMode/2D Measurements & Calculations  IVSd: 0.72 cm  LVIDd: 4.8 cm  LVIDs: 3.2 cm  LVPWd: 0.79 cm  FS: 33.3 %  LV mass(C)d: 117.4 grams  LV mass(C)dI: 58.3 grams/m2  Ao root diam: 3.0 cm  asc Aorta Diam: 2.8 cm  LVOT diam: 2.2 cm  LVOT area: 3.8 cm2  LA Volume (BP): 42.1 ml     LA Volume Index (BP): 20.9 ml/m2  RWT: 0.33     Doppler Measurements & Calculations  MV E max abdifatah: 64.3 cm/sec  MV A max abdifatah: 61.7 cm/sec  MV E/A: 1.0  MV dec slope: 298.0 cm/sec2  TR max abdifatah: 232.0 cm/sec  TR max P.5 mmHg  E/E' av.1  Lateral E/e': 5.2  Medial E/e': 7.0     ______________________________________________________________________________  Report  approved by: MD Brian Guardado 01/31/2022 11:03 AM

## 2022-01-31 NOTE — H&P
[facility]    History and Physical - Medicine Service, MAROON TEAM        Date of Admission:  1/31/2022    Assessment & Plan      Jessica Farnsworth is a 32 year old male admitted on 1/31/2022. He has a history of H pylori infection (2018), recent COVID-19 infection (diagnosed 1/20/2022) and is admitted for left upper chest pain with nodule and hilar adenopathy on CT, concerning for infection.     Pleuritic chest pain  Left upper lobe pulmonary nodule  Left hilar lymphadenopathy  Recent confirmed COVID-19 infection (1/20)   History of latent tuberculosis, treated (2017)  Differential includes infectious (bacterial vs viral in setting of recent COVID infection vs vs fungal vs other atypical infection) vs sarcoidosis (though would more likely be bilateral) vs malignancy vs lymphoma. Labs not suggestive of occult bacterial infection with no leukocytosis, CRP only mildly elevated at 11. Chart review revealed history of treated latent TB (not noted by ED here), so this is unlikely to represent TB. Chest pain could be entirely unrelated to CT findings, may be ongoing from recent COVID infection; fortunately PE ruled out and troponin negative.   - Admit to medicine  - Follow up echo ordered by ED  - Follow up blood cultures  - Consider further diagnostic eval (sputum culture, infectious studies like viral panel, biopsy) vs repeat imaging in several weeks to monitor for resolution of hilar lymphadenopathy    Right hip pain, likely MSK - tendonitis vs bursitis  No fracture on plain film. Normal range of motion, though mildly painful. Labs not suggestive of septic arthritis.      Diet:   Regular  DVT Prophylaxis: Low Risk/Ambulatory with no VTE prophylaxis indicated  Diaz Catheter: Not present  Fluids: None  Central Lines: None  Cardiac Monitoring: None  Code Status:  Full code    Disposition Plan   Expected Discharge: 2-3 days   Anticipated discharge location:  Awaiting care  "coordination huddle       Patient will be formally staffed in the morning.    Alicia Whyte MD  Medicine Service, [facility]  Securely message with the Vocera Web Console (learn more here)  Text page via [employer] Paging/Directory   Please see signed in provider for up to date coverage information    ______________________________________________________________________    Chief Complaint   Chest pain    History is obtained from the patient.    History of Present Illness   Jessica Farnsworth is a 32 year old male who presents with chest pain. History primarily given by wife. Has had left sided chest pain, lower anterior, for two days, worse with coughing and movement. Not exacerbated by activities other than raising his left arm. No other chest pain, SOB, or palpitations. Cough with COVID infection, getting better. Had fever with COVID also, those have been gone for a while. Also with some right hip pain, no recent trauma or injury. Occasionally with pain into groin but also radiates into buttocks.    Recent COVID infection - Presented with chest pain on 1/20, chest x-ray showed bibasilar infiltrates, positive for COVID. Not hypoxic or febrile. Not vaccinated for COVID.    Per chart review from Care Everywhere (7/2020), \"He also has a history of pneumonia and tuberculosis. He has completed treatment for TB in the past. He saw Infectious Disease last year. Chest x-ray done today is without evidence for pneumonia or tuberculosis.\"    From ID note 5/2019, \"This patient is an immigrant from [place], whom I saw in 2017, and treated for latent tuberculosis TB. At the time he was HIV negative. He completed treatment for that.\"    Review of Systems    The 10 point Review of Systems is negative other than noted in the HPI or here.     Past Medical History    I have reviewed this patient's medical history and updated it with pertinent information if needed. "   Latent TB - treated in 2017    Past Surgical History   I have reviewed this patient's surgical history and updated it with pertinent information if needed.  No surgeries.    Social History   I have reviewed this patient's social history and updated it with pertinent information if needed. Jessica Farnsworth  reports that he has never smoked. He has never used smokeless tobacco. He reports previous alcohol use. He reports previous drug use.    Immigrated from [place]. . Non-smoker, no drug use, no alcohol.    Family History   No known family history of pulmonary disease, TB.    Prior to Admission Medications   Prior to Admission Medications   Prescriptions Last Dose Informant Patient Reported? Taking?   UNKNOWN TO PATIENT Unknown at Unknown time  Yes Yes   Sig: Place 1 drop Into the left eye 3 times daily Unknown eye med drop   ibuprofen (ADVIL/MOTRIN) 600 MG tablet 1/30/2022 at Unknown time  No Yes   Sig: Take 1 tablet (600 mg) by mouth every 8 hours as needed for moderate pain      Facility-Administered Medications: None     Allergies   Allergies   Allergen Reactions     Olopatadine Other (See Comments)     Worsening of conjunctivitis       Physical Exam   Vital Signs: Temp: 97.8  F (36.6  C)   BP: 124/82 Pulse: 70   Resp: 16 SpO2: 100 % O2 Device: None (Room air)    Weight: 175 lbs 0 oz    General: Well appearing though tired; in no acute distress  HEENT: Atraumatic, normocephalic; nares clear, mucous membranes moist  Neck: No cervical adenopathy  Chest: Reproducible tenderness over left anterior chest wall  Resp: Breathing comfortably on room air; lungs clear to auscultation bilaterally  CV: Regular rhythm, normal rate; no murmurs appreciated  Abdomen: Soft, nondistended, nontender; bowel sounds present; no CVA tenderness  Extremities: Warm and well perfused; no lower extremity edema; no calf tenderness; tenderness to palpation over right hip  Skin: No lesions on exposed skin  Neuro: Alert,  oriented x 3; cranial nerves grossly intact     Data   Data reviewed today: I reviewed all medications, new labs and imaging results over the last 24 hours. I personally reviewed the chest CT image(s) showing left upper lobe nodule, left hilar adenopathy.    Recent Labs   Lab 01/31/22  0111   WBC 7.0   HGB 14.9   MCV 88         POTASSIUM 3.8   CHLORIDE 105   CO2 27   BUN 9   CR 0.80   ANIONGAP 3   RADHA 9.4   GLC 95   ALBUMIN 4.0   PROTTOTAL 8.8   BILITOTAL 0.3   ALKPHOS 76   ALT 47   AST 20

## 2022-01-31 NOTE — ED TRIAGE NOTES
Pain to right hip radiating into right leg starting yesterday, worse today. Recent COVID dx, denies trauma or strain, CMS intact.

## 2022-01-31 NOTE — ED PROVIDER NOTES
ED Provider Note  [facility]      History     Chief Complaint   Patient presents with     Leg Pain     HPI  Jessica Farnsworth is a 32 year old male with recent Covid-19 infection (positive 1/20/22) presenting to the ED with worsening left upper chest pain, occasionally pleuritic, radiating to back, worsening for about 2 to 3 days.  Occasionally has experienced chills and sweats but no fevers.    Additionally, reports Right hip pain radiating into the right leg onset yesterday.  Occasionally feels pain in the right lower quadrant associated with this hip pain.  No history of trauma.  No swelling or fever.  Painful to walk, appears to shoot from buttock and groin.   No testicular pain, swelling or penile discharge.    Past Medical History  No past medical history on file.  No past surgical history on file.  ibuprofen (ADVIL/MOTRIN) 600 MG tablet  UNKNOWN TO PATIENT      Allergies   Allergen Reactions     Olopatadine Other (See Comments)     Worsening of conjunctivitis     Family History  No family history on file.  Social History   Social History     Tobacco Use     Smoking status: Never Smoker     Smokeless tobacco: Never Used   Substance Use Topics     Alcohol use: Not Currently     Drug use: Not Currently      Past medical history, past surgical history, medications, allergies, family history, and social history were reviewed with the patient. No additional pertinent items.       Review of Systems   10 point review of symptoms was performed and is negative except as noted above.     Physical Exam   BP: 124/82  Pulse: 70  Temp: 97.8  F (36.6  C)  Resp: 16  Weight: 79.4 kg (175 lb)  SpO2: 100 %  Physical Exam  GEN:non toxic, cooperative and conversant.   HEENT: The head is normocephalic and atraumatic. Pupils are equal round and reactive to light. Extraocular motions are intact. There is no facial swelling. The neck is nontender and supple.   CV: Regular rate and rhythm . 2+ radial pulses  bilaterally.  PULM: Clear to auscultation bilaterally.  ABD: Soft, diffuse mild tenderness palpation, nondistended.   EXT: Full range of motion.  No edema.  NEURO: Cranial nerves II through XII are intact and symmetric.  Straight leg raise equivocal, no clear radiculopathy.  Tender in buttock area diffusely nonspecifically in the sciatic notch.  EHL, FHL, TA 5/5 and symmetric, SILT.     SKIN: No rashes, ecchymosis, or lacerations  PSYCH: Calm and cooperative, interactive.     ED Course      Procedures                     Results for orders placed or performed during the hospital encounter of 01/31/22   CT Chest Pulmonary Embolism w Contrast     Status: None    Narrative    EXAM: CT CHEST PULMONARY EMBOLISM W CONTRAST, CT ABDOMEN PELVIS W CONTRAST  LOCATION: [facility] [place]  DATE/TIME: 1/31/2022 2:58 AM    INDICATION: PE suspected, high prob.  COMPARISON: CT chest dated 11/14/2018.  TECHNIQUE: CT chest pulmonary angiogram during arterial phase injection of IV contrast. CT abdomen and pelvis were performed during the portal venous phase. Multiplanar reformats and MIP reconstructions were performed. Dose reduction techniques were   used.   CONTRAST: 107 mL Isovue 370.     FINDINGS:  ANGIOGRAM CHEST: Pulmonary arteries are normal caliber and negative for pulmonary emboli. Thoracic aorta is negative for dissection. No CT evidence of right heart strain.    LUNGS AND PLEURA: 5.8 x 4.5 mm solid nodular density in the left upper lobe (5, 26) that appears to extend along the bronchovascular margins. No pneumothorax or pleural effusion.    MEDIASTINUM/AXILLAE: Interval development of 3.7 x 2.4 cm left hilar lymphadenopathy.    CORONARY ARTERY CALCIFICATION: None.    ABDOMEN: Normal liver, gallbladder, spleen, adrenals, and pancreas. Normal kidneys. No obstructing renal or ureteral stones. No free air or fluid. No retroperitoneal or mesenteric lymphadenopathy.    PELVIS: Normal appendix.  No obstruction, colitis, or diverticulitis. No free air or fluid. Normal bladder.    MUSCULOSKELETAL: Mild multilevel discogenic degenerative change. No joint effusions. No radiographic evidence of osteomyelitis.      Impression    IMPRESSION:  1.  No pulmonary embolus, aortic dissection, or aneurysm.  2.  New 3.7 x 2.4 cm left hilar lymphadenopathy with a 0.6 x 0.5 mm left upper lobe solid pulmonary nodule that appears tree-in-bud like. Diagnostic considerations include infectious, inflammatory, and malignant etiologies to include tuberculosis.   Recommend follow-up to resolution.    Findings were discussed with Dr. Hopkins at 3:46 AM on 01/31/2021.    Imaging features are atypical or uncommonly reported for (COVID-19) pneumonia. Alternative diagnoses should be considered.   CT Abdomen Pelvis w Contrast     Status: None    Narrative    EXAM: CT CHEST PULMONARY EMBOLISM W CONTRAST, CT ABDOMEN PELVIS W CONTRAST  LOCATION: [facility]  DATE/TIME: 1/31/2022 2:58 AM    INDICATION: PE suspected, high prob.  COMPARISON: CT chest dated 11/14/2018.  TECHNIQUE: CT chest pulmonary angiogram during arterial phase injection of IV contrast. CT abdomen and pelvis were performed during the portal venous phase. Multiplanar reformats and MIP reconstructions were performed. Dose reduction techniques were   used.   CONTRAST: 107 mL Isovue 370.     FINDINGS:  ANGIOGRAM CHEST: Pulmonary arteries are normal caliber and negative for pulmonary emboli. Thoracic aorta is negative for dissection. No CT evidence of right heart strain.    LUNGS AND PLEURA: 5.8 x 4.5 mm solid nodular density in the left upper lobe (5, 26) that appears to extend along the bronchovascular margins. No pneumothorax or pleural effusion.    MEDIASTINUM/AXILLAE: Interval development of 3.7 x 2.4 cm left hilar lymphadenopathy.    CORONARY ARTERY CALCIFICATION: None.    ABDOMEN: Normal liver, gallbladder, spleen, adrenals, and  pancreas. Normal kidneys. No obstructing renal or ureteral stones. No free air or fluid. No retroperitoneal or mesenteric lymphadenopathy.    PELVIS: Normal appendix. No obstruction, colitis, or diverticulitis. No free air or fluid. Normal bladder.    MUSCULOSKELETAL: Mild multilevel discogenic degenerative change. No joint effusions. No radiographic evidence of osteomyelitis.      Impression    IMPRESSION:  1.  No pulmonary embolus, aortic dissection, or aneurysm.  2.  New 3.7 x 2.4 cm left hilar lymphadenopathy with a 0.6 x 0.5 mm left upper lobe solid pulmonary nodule that appears tree-in-bud like. Diagnostic considerations include infectious, inflammatory, and malignant etiologies to include tuberculosis.   Recommend follow-up to resolution.    Findings were discussed with Dr. Hopkins at 3:46 AM on 01/31/2021.    Imaging features are atypical or uncommonly reported for (COVID-19) pneumonia. Alternative diagnoses should be considered.   XR Hip Right 2-3 Views     Status: None    Narrative    EXAM: XR HIP RIGHT 2-3 VIEWS  LOCATION: [facility]  DATE/TIME: 1/31/2022 2:15 AM    INDICATION: Right hip pain  COMPARISON: None.      Impression    IMPRESSION: Normal joint spaces and alignment. No fracture.    Comprehensive metabolic panel     Status: Normal   Result Value Ref Range    Sodium 135 133 - 144 mmol/L    Potassium 3.8 3.4 - 5.3 mmol/L    Chloride 105 94 - 109 mmol/L    Carbon Dioxide (CO2) 27 20 - 32 mmol/L    Anion Gap 3 3 - 14 mmol/L    Urea Nitrogen 9 7 - 30 mg/dL    Creatinine 0.80 0.66 - 1.25 mg/dL    Calcium 9.4 8.5 - 10.1 mg/dL    Glucose 95 70 - 99 mg/dL    Alkaline Phosphatase 76 40 - 150 U/L    AST 20 0 - 45 U/L    ALT 47 0 - 70 U/L    Protein Total 8.8 6.8 - 8.8 g/dL    Albumin 4.0 3.4 - 5.0 g/dL    Bilirubin Total 0.3 0.2 - 1.3 mg/dL    GFR Estimate >90 >60 mL/min/1.73m2   Lactic acid whole blood     Status: Abnormal   Result Value Ref Range    Lactic Acid  0.6 (L) 0.7 - 2.0 mmol/L   Troponin I     Status: Normal   Result Value Ref Range    Troponin I High Sensitivity 4 <79 ng/L   CRP inflammation     Status: Abnormal   Result Value Ref Range    CRP Inflammation 11.0 (H) 0.0 - 8.0 mg/L   Erythrocyte sedimentation rate auto     Status: Abnormal   Result Value Ref Range    Erythrocyte Sedimentation Rate 20 (H) 0 - 15 mm/hr   CBC with platelets and differential     Status: None   Result Value Ref Range    WBC Count 7.0 4.0 - 11.0 10e3/uL    RBC Count 4.99 4.40 - 5.90 10e6/uL    Hemoglobin 14.9 13.3 - 17.7 g/dL    Hematocrit 43.7 40.0 - 53.0 %    MCV 88 78 - 100 fL    MCH 29.9 26.5 - 33.0 pg    MCHC 34.1 31.5 - 36.5 g/dL    RDW 12.7 10.0 - 15.0 %    Platelet Count 278 150 - 450 10e3/uL    % Neutrophils 53 %    % Lymphocytes 36 %    % Monocytes 6 %    % Eosinophils 4 %    % Basophils 1 %    % Immature Granulocytes 0 %    NRBCs per 100 WBC 0 <1 /100    Absolute Neutrophils 3.7 1.6 - 8.3 10e3/uL    Absolute Lymphocytes 2.6 0.8 - 5.3 10e3/uL    Absolute Monocytes 0.4 0.0 - 1.3 10e3/uL    Absolute Eosinophils 0.3 0.0 - 0.7 10e3/uL    Absolute Basophils 0.1 0.0 - 0.2 10e3/uL    Absolute Immature Granulocytes 0.0 <=0.4 10e3/uL    Absolute NRBCs 0.0 10e3/uL   CBC with platelets differential     Status: None    Narrative    The following orders were created for panel order CBC with platelets differential.  Procedure                               Abnormality         Status                     ---------                               -----------         ------                     CBC with platelets and d...[[id number]]                      Final result                 Please view results for these tests on the individual orders.     Medications   iopamidol (ISOVUE-370) solution 107 mL (107 mLs Intravenous Given 1/31/22 0311)   sodium chloride 0.9 % bag 500mL for CT scan flush use (90 mLs Intravenous Given 1/31/22 0311)   acetaminophen (TYLENOL) tablet 1,000 mg (1,000 mg Oral Given  1/31/22 0428)   ketorolac (TORADOL) injection 30 mg (30 mg Intravenous Given 1/31/22 0429)        Assessments & Plan (with Medical Decision Making)   32-year-old male recent Covid positive as noted above presenting with pleuritic left upper chest pain as well as right hip pain    Chest pain:  DDx includes  Acute coronary syndrome, pulmonary embolism, pneumonia, pneumothorax, congestive heart failure, uremia, renal failure, among other causes  -Recently diagnosed with Covid, so possibly related to Covid syndrome however very pleuritic and focal  -CT PE ordered excluded pulmonary embolism but demonstrated concerning left upper lung pulmonary nodule, LA, suspicious for infection possibly tuberculosis  On further history, patient has been in the [place] for 6 [place], does not recall ever being tested for screen for tuberculosis.  Also possible malignancy but less likely given it is fairly new and rapidly developed.  Will order quant to [facility] as well as initiate AFB sputum cultures /stain.    Right hip abdominal pain  DDx includes enteritis, bowel obstruction, iliopsoas abscess, other infection, septic joint, among other causes.  CT abdomen pelvis overall unrevealing and no evidence of abscess or obstruction.  No evidence of joint swelling or edema, there is mild arthritis present.  CRP reassuring against septic joint as well.  On repeat assessment at 4:45 AM patient's hip pain appears to be greatly improved.  He is able to sit with knees bent quite comfortably.  Abdominal pain also significantly improved.    Discussed with internal medicine and admitted for TB rule out, consideration of biopsy, and reevaluation of right hip pain    I have reviewed the nursing notes. I have reviewed the findings, diagnosis, plan and need for follow up with the patient.    New Prescriptions    No medications on file       Final diagnoses:   Chest pain, unspecified type   Pulmonary nodules   Hip pain, right       --  MD YUNIER Chiu  [facility] EMERGENCY DEPARTMENT  1/30/2022     Tato Kimble MD  01/31/22 0525

## 2022-01-31 NOTE — CONSULTS
[facility] Inpatient Consult Dermatology Note    Impression/Plan:  1. Folliculitis  Natural history and etiology discussed. Advised this is a result of inflammation in the hair follicle, which can be exacerbated by normal bacteria on the skin. Targeted therapies to address the bacterial growth can be beneficial. Based on morphology of these lesions, biopsy would not be recommended at this time. Recommend treatment regimen as below.  - start benzoyl peroxide wash, clindamycin lotion to areas    We will sign off at this time.    Patient seen and evaluated with attending physician, Dr. Qasim Barrera MD  Medicine/Dermatology Resident  I, Meenu Tripp MD, saw this patient with the resident and agree with the resident s findings and plan of care as documented in the resident s note.        Dermatology Problem List:  1. Folliculitis - BPO wash, clindamycin lotion    Date of Admission: Jan 30, 2022   Encounter Date: 01/31/2022    Reason for Consultation:   rash    History of Present Illness:  Mr. Jessica Farnsworth is a 32 year old male with PMH of latent TB who was admitted 1/31 for chest pain, hilar adenopathy on CT concerning for infection. Dermatology was consulted for rash on chest. Patient reports that one lesion first appeared about 5 days ago. It expressed pus and then crusted over with some blood. The other lesion (lower on the chest) was asymptomatic (not itchy or painful) but appeared at some point after that. He has a history of acne and scarring on the face from acne but no other skin conditions.    Past Medical History:   Patient Active Problem List   Diagnosis    Pulmonary nodules    Hip pain, right    Chest pain, unspecified type     No past medical history on file.  No past surgical history on file.      Social History:  Patient reports that he has never smoked. He has never used smokeless tobacco. He reports previous alcohol use. He reports previous drug use.    Family  History:  No family history on file.    Medications:  Current Facility-Administered Medications   Medication    ampicillin-sulbactam (UNASYN) 3 g vial to attach to  mL bag    azithromycin (ZITHROMAX) 500 mg in sodium chloride 0.9 % 250 mL intermittent infusion    [Held by provider] ibuprofen (ADVIL/MOTRIN) tablet 200 mg    lidocaine (LMX4) cream    lidocaine 1 % 0.1-1 mL    melatonin tablet 1 mg    ondansetron (ZOFRAN-ODT) ODT tab 4 mg    Or    ondansetron (ZOFRAN) injection 4 mg    polyethylene glycol (MIRALAX) Packet 17 g    prochlorperazine (COMPAZINE) injection 10 mg    Or    prochlorperazine (COMPAZINE) tablet 10 mg    Or    prochlorperazine (COMPAZINE) suppository 25 mg    sodium chloride (PF) 0.9% PF flush 3 mL    sodium chloride (PF) 0.9% PF flush 3 mL     Current Outpatient Medications   Medication    ibuprofen (ADVIL/MOTRIN) 600 MG tablet    UNKNOWN TO PATIENT          Allergies   Allergen Reactions    Olopatadine Other (See Comments)     Worsening of conjunctivitis         Review of Systems:  -As per HPI      Physical exam:  Vitals: /79   Pulse 52   Temp 97.8  F (36.6  C)   Resp 16   Wt 79.4 kg (175 lb)   SpO2 99%   BMI 23.73 kg/m    GEN: This is a well developed, well-nourished male in no acute distress, in a pleasant mood.    SKIN: Total skin excluding the undergarment areas was performed. The exam included the head/face, neck, both arms, chest, back, abdomen, both legs, digits and/or nails.   - On the chest there are perifollicular papules c/w folliculitis, one lesion near the neck has hemorrhagic crust overlying it  - On the cheeks there are acneiform papules and deep nodules, some areas of icepick and boxcar scarring  -No other lesions of concern on areas examined.     Laboratory:  Results for orders placed or performed during the hospital encounter of 01/31/22 (from the past 24 hour(s))   Blood Culture Peripheral Blood    Specimen: Peripheral Blood   Result Value Ref Range     Culture No growth after 12 hours    CBC with platelets differential    Narrative    The following orders were created for panel order CBC with platelets differential.  Procedure                               Abnormality         Status                     ---------                               -----------         ------                     CBC with platelets and d...[[id number]]                      Final result                 Please view results for these tests on the individual orders.   Comprehensive metabolic panel   Result Value Ref Range    Sodium 135 133 - 144 mmol/L    Potassium 3.8 3.4 - 5.3 mmol/L    Chloride 105 94 - 109 mmol/L    Carbon Dioxide (CO2) 27 20 - 32 mmol/L    Anion Gap 3 3 - 14 mmol/L    Urea Nitrogen 9 7 - 30 mg/dL    Creatinine 0.80 0.66 - 1.25 mg/dL    Calcium 9.4 8.5 - 10.1 mg/dL    Glucose 95 70 - 99 mg/dL    Alkaline Phosphatase 76 40 - 150 U/L    AST 20 0 - 45 U/L    ALT 47 0 - 70 U/L    Protein Total 8.8 6.8 - 8.8 g/dL    Albumin 4.0 3.4 - 5.0 g/dL    Bilirubin Total 0.3 0.2 - 1.3 mg/dL    GFR Estimate >90 >60 mL/min/1.73m2   Lactic acid whole blood   Result Value Ref Range    Lactic Acid 0.6 (L) 0.7 - 2.0 mmol/L   Troponin I   Result Value Ref Range    Troponin I High Sensitivity 4 <79 ng/L   Blood Culture Peripheral Blood    Specimen: Peripheral Blood   Result Value Ref Range    Culture No growth after 12 hours    CRP inflammation   Result Value Ref Range    CRP Inflammation 11.0 (H) 0.0 - 8.0 mg/L   Erythrocyte sedimentation rate auto   Result Value Ref Range    Erythrocyte Sedimentation Rate 20 (H) 0 - 15 mm/hr   CBC with platelets and differential   Result Value Ref Range    WBC Count 7.0 4.0 - 11.0 10e3/uL    RBC Count 4.99 4.40 - 5.90 10e6/uL    Hemoglobin 14.9 13.3 - 17.7 g/dL    Hematocrit 43.7 40.0 - 53.0 %    MCV 88 78 - 100 fL    MCH 29.9 26.5 - 33.0 pg    MCHC 34.1 31.5 - 36.5 g/dL    RDW 12.7 10.0 - 15.0 %    Platelet Count 278 150 - 450 10e3/uL    % Neutrophils 53 %     % Lymphocytes 36 %    % Monocytes 6 %    % Eosinophils 4 %    % Basophils 1 %    % Immature Granulocytes 0 %    NRBCs per 100 WBC 0 <1 /100    Absolute Neutrophils 3.7 1.6 - 8.3 10e3/uL    Absolute Lymphocytes 2.6 0.8 - 5.3 10e3/uL    Absolute Monocytes 0.4 0.0 - 1.3 10e3/uL    Absolute Eosinophils 0.3 0.0 - 0.7 10e3/uL    Absolute Basophils 0.1 0.0 - 0.2 10e3/uL    Absolute Immature Granulocytes 0.0 <=0.4 10e3/uL    Absolute NRBCs 0.0 10e3/uL   XR Hip Right 2-3 Views    Narrative    EXAM: XR HIP RIGHT 2-3 VIEWS  LOCATION: [facility]  DATE/TIME: 1/31/2022 2:15 AM    INDICATION: Right hip pain  COMPARISON: None.      Impression    IMPRESSION: Normal joint spaces and alignment. No fracture.    CT Chest Pulmonary Embolism w Contrast    Narrative    EXAM: CT CHEST PULMONARY EMBOLISM W CONTRAST, CT ABDOMEN PELVIS W CONTRAST  LOCATION: [facility]  DATE/TIME: 1/31/2022 2:58 AM    INDICATION: PE suspected, high prob.  COMPARISON: CT chest dated 11/14/2018.  TECHNIQUE: CT chest pulmonary angiogram during arterial phase injection of IV contrast. CT abdomen and pelvis were performed during the portal venous phase. Multiplanar reformats and MIP reconstructions were performed. Dose reduction techniques were   used.   CONTRAST: 107 mL Isovue 370.     FINDINGS:  ANGIOGRAM CHEST: Pulmonary arteries are normal caliber and negative for pulmonary emboli. Thoracic aorta is negative for dissection. No CT evidence of right heart strain.    LUNGS AND PLEURA: 5.8 x 4.5 mm solid nodular density in the left upper lobe (5, 26) that appears to extend along the bronchovascular margins. No pneumothorax or pleural effusion.    MEDIASTINUM/AXILLAE: Interval development of 3.7 x 2.4 cm left hilar lymphadenopathy.    CORONARY ARTERY CALCIFICATION: None.    ABDOMEN: Normal liver, gallbladder, spleen, adrenals, and pancreas. Normal kidneys. No obstructing renal  or ureteral stones. No free air or fluid. No retroperitoneal or mesenteric lymphadenopathy.    PELVIS: Normal appendix. No obstruction, colitis, or diverticulitis. No free air or fluid. Normal bladder.    MUSCULOSKELETAL: Mild multilevel discogenic degenerative change. No joint effusions. No radiographic evidence of osteomyelitis.      Impression    IMPRESSION:  1.  No pulmonary embolus, aortic dissection, or aneurysm.  2.  New 3.7 x 2.4 cm left hilar lymphadenopathy with a 0.6 x 0.5 mm left upper lobe solid pulmonary nodule that appears tree-in-bud like. Diagnostic considerations include infectious, inflammatory, and malignant etiologies to include tuberculosis.   Recommend follow-up to resolution.    Findings were discussed with Dr. Hopkins at 3:46 AM on 01/31/2021.    Imaging features are atypical or uncommonly reported for (COVID-19) pneumonia. Alternative diagnoses should be considered.   CT Abdomen Pelvis w Contrast    Narrative    EXAM: CT CHEST PULMONARY EMBOLISM W CONTRAST, CT ABDOMEN PELVIS W CONTRAST  LOCATION: [facility]  DATE/TIME: 1/31/2022 2:58 AM    INDICATION: PE suspected, high prob.  COMPARISON: CT chest dated 11/14/2018.  TECHNIQUE: CT chest pulmonary angiogram during arterial phase injection of IV contrast. CT abdomen and pelvis were performed during the portal venous phase. Multiplanar reformats and MIP reconstructions were performed. Dose reduction techniques were   used.   CONTRAST: 107 mL Isovue 370.     FINDINGS:  ANGIOGRAM CHEST: Pulmonary arteries are normal caliber and negative for pulmonary emboli. Thoracic aorta is negative for dissection. No CT evidence of right heart strain.    LUNGS AND PLEURA: 5.8 x 4.5 mm solid nodular density in the left upper lobe (5, 26) that appears to extend along the bronchovascular margins. No pneumothorax or pleural effusion.    MEDIASTINUM/AXILLAE: Interval development of 3.7 x 2.4 cm left hilar  lymphadenopathy.    CORONARY ARTERY CALCIFICATION: None.    ABDOMEN: Normal liver, gallbladder, spleen, adrenals, and pancreas. Normal kidneys. No obstructing renal or ureteral stones. No free air or fluid. No retroperitoneal or mesenteric lymphadenopathy.    PELVIS: Normal appendix. No obstruction, colitis, or diverticulitis. No free air or fluid. Normal bladder.    MUSCULOSKELETAL: Mild multilevel discogenic degenerative change. No joint effusions. No radiographic evidence of osteomyelitis.      Impression    IMPRESSION:  1.  No pulmonary embolus, aortic dissection, or aneurysm.  2.  New 3.7 x 2.4 cm left hilar lymphadenopathy with a 0.6 x 0.5 mm left upper lobe solid pulmonary nodule that appears tree-in-bud like. Diagnostic considerations include infectious, inflammatory, and malignant etiologies to include tuberculosis.   Recommend follow-up to resolution.    Findings were discussed with Dr. Hopkins at 3:46 AM on 01/31/2021.    Imaging features are atypical or uncommonly reported for (COVID-19) pneumonia. Alternative diagnoses should be considered.   Quantiferon-TB Gold Plus    Specimen: Arm, Right; Blood    Narrative    The following orders were created for panel order Quantiferon-TB Gold Plus.  Procedure                               Abnormality         Status                     ---------                               -----------         ------                     Quantiferon TB Gold Plus...[[id number]]                      In process                 Quantiferon TB Gold Plus...[[id number]]                      In process                 Quantiferon TB Gold Plus...[[id number]]                      In process                 Quantiferon TB Gold Plus...[[id number]]                      In process                   Please view results for these tests on the individual orders.   Echocardiogram Complete   Result Value Ref Range    LVEF  55-60%     Narrative    [id number]  [id number]  [id number]  [id number]^[place]^L      [facility],[place]  Echocardiography Laboratory  [address] [place] [address]     Name: SOM BEY  MRN: [id number]  : 1990  Study Date: 2022 09:40 AM  Age: 32 yrs  Gender: Male  Patient Location: [facility]  Reason For Study: Endocarditis  Ordering Physician: ALEXANDRIA NESS  Performed By: Alix Andrade     BSA: 2.0 m2  Height: 72 in  Weight: 175 lb  ______________________________________________________________________________  Procedure  Complete Portable Echo Adult. Echocardiogram with two-dimensional, color and  spectral Doppler performed.  ______________________________________________________________________________  Interpretation Summary  No significant valvular abnormalities were noted. No vegetation or mass  identified, however this does not exclude endocarditis.  ______________________________________________________________________________  Left Ventricle  Global and regional left ventricular function is normal with an EF of 55-60%.  Left ventricular size is normal. Left ventricular diastolic function is  normal.     Right Ventricle  Right ventricular function, chamber size, wall motion, and thickness are  normal.     Atria  Both atria appear normal.     Mitral Valve  The mitral valve is normal.     Aortic Valve  Aortic valve is normal in structure and function. The aortic valve is  tricuspid.     Tricuspid Valve  The tricuspid valve is normal. Mild tricuspid insufficiency is present. The  right ventricular systolic pressure is approximated at 21.5 mmHg plus the  right atrial pressure. Pulmonary artery systolic pressure is normal.     Pulmonic Valve  The pulmonic valve is normal. Trace pulmonic insufficiency is present.     Vessels  The aorta root is normal. The thoracic aorta is normal. The pulmonary artery  cannot be assessed. The inferior vena cava was normal in size with preserved  respiratory variability.     Pericardium  No pericardial effusion  is present.     Compared to Previous Study  Previous study not available for comparison.  ______________________________________________________________________________  MMode/2D Measurements & Calculations  IVSd: 0.72 cm  LVIDd: 4.8 cm  LVIDs: 3.2 cm  LVPWd: 0.79 cm  FS: 33.3 %  LV mass(C)d: 117.4 grams  LV mass(C)dI: 58.3 grams/m2  Ao root diam: 3.0 cm  asc Aorta Diam: 2.8 cm  LVOT diam: 2.2 cm  LVOT area: 3.8 cm2  LA Volume (BP): 42.1 ml     LA Volume Index (BP): 20.9 ml/m2  RWT: 0.33     Doppler Measurements & Calculations  MV E max abdifatah: 64.3 cm/sec  MV A max abdifatah: 61.7 cm/sec  MV E/A: 1.0  MV dec slope: 298.0 cm/sec2  TR max abdifatah: 232.0 cm/sec  TR max P.5 mmHg  E/E' av.1  Lateral E/e': 5.2  Medial E/e': 7.0     ______________________________________________________________________________  Report approved by: MD Brian Guardado 2022 11:03 AM         Pulmonary General Adult IP Consult: Patient to be seen: Routine within 24 hrs; Call back #: [phone]; post covid infection - hilar lymphadenopathy and new nodule - does have history of latent TB. Evaluate for need for bronch vs follow up. Family...    Narrative    Edgardo Robles MD     2022  3:54 PM  [facility]   Pulmonary   Consult Note 2022  Jessica Farnsworth MRN: [id number]    We were consulted for evaluation of left upper lung nodule and   left hilar lymphadenopathy seen on chest CT.     Assessment & Plan      Jessica Farnsworth is a 32 year old male with a history of a   recent COVID-19 infection (positive 22) who presented to   [facility] on 2022 with complaints of worsening left upper chest   pain. On admission patient was diagnosed w/ left upper lung   nodule and left hilar lymphadenopathy seen on chest CT.    The left upper lung nodule (0.6 cm x 0.5 cm) with the likely   associated left hilar lymphadenopathy (3.7 cm x 2.4 cm) seen on   chest CT today is likely infectious in nature with TB  highest on   the differential due to a recent exposure and night sweats. The   patient has had a recent positive COVID-19 infection making him   more susceptible to a superimposed bacterial vs. fungal   infection. He has had subjective fevers at home but afebrile in   the hospital as well as a normal CBC, pending quantiferon gold   test, AFB culture and stain, and blood cultures. Still would   recommend broad workup for bacterial and fungal causes. Less   likely to be ongoing COVID-19 pneumonia given lack of any lung   parenchymal involvement (single nodule with associated adenopathy   is unusual for COVID-19).    Also on the differential is an inflammatory process; sarcoidosis   being highest on that list due to the indistinct rash, right hip   pain, and slightly elevated inflammatory markers (CRP 11 & ESR   20). However, normal calcium level and no other symptoms   associated with sarcoidosis. He has had no rhinitis, history of   asthma, or kidney disease so RA, EGPA, and GPA are also lower on   the differential.      He has had B-symptoms, night sweats and 10 lbs weight loss, these   last two weeks. Due to the lymphadenopathy and small nodule,   presentation could be secondary to malignancy such as a new   lymphoma -> no adenopathy palpable on physical examination.    Recommendations:    - Procalcitonin    - Strep pneumo & legionella urine antigens   - Histoplasma and blastomycoses urine antigens   - Aspergillus galactomannan    - Fungitell   - Sputum sample    - Bacterial culture and gram stain    - AFB culture and stain    - Fungal culture   - Syphilis screen   - HIV screen   - Trend blood cultures   - Trend ESR & CRP   - SELINA with reflex    - ANCA with reflex   - Agree with quantiferon gold test    - Continue empiric antibiotics with anaerobic and atypical   coverage   - Keep on airborne precautions due to concerns for TB infection       - We will discuss with Interventionally Pulmonary Medicine for a    potential EBUS of the left hilar lymph node    - NPO at midnight    - Hold DVT prophylaxis and all NSAIDs   - Skin rash evaluation    - Consider dermatology evaluation with possible skin biopsy     We will continue to follow. Thank you for this very interesting   consult.    I was present with the medical student who participated in the   service and in the documentation of the note. I have verified the   history and personally performed the physical exam and medical   decisionmaking. I agree with the assessment and plan of care as   documented in the note.    Patient seen & discussed w/  Dr. Villarreal, who agrees with the   above assessment and plan.    Edgardo Robles M.D.  Pulmonary and Critical Care Medicine Fellow  1/31/2022          History of Present Illness:     Jessica Farnsworth is a 32 year old male with a history of a   recent COVID-19 infection (positive 1/20/22) and a potential   latent TB (treated 2017?) and who presented to [facility] on 1/31/2022   with complaints of worsening left upper chest pain.  On admission   patient was diagnosed w/ left upper lung nodule and left hilar   lymphadenopathy seen on chest CT.    Per chart review, the patient went to the [facility] ED on 1/20/22 due   to left chest and shoulder pain. His EKG was normal, troponin   negative, normal ProBNP (14), BMP with a glucose of 123 otherwise   WNL, and his CXR showed infiltrates in both lung bases otherwise   normal. At that time, he tested positive for COVID-19 [negative   for Influenza A/B and RSV) and was sent home to quarantine and   rest (also advised getting the COVID-19 vaccine when   appropriate).     In the ED, CBC WNL, CMP WNL, lactate slightly low, CRP slightly   elevated at 11, ESR elevated at 20, and a normal troponin. His   blood cultures, quantiferon, and AFB culture and stain are   pending. For imaging, his chest CT PE (1/31) showed no pulmonary   embolus, aortic dissection, or aneurysm but new 3.7 x 2.4 cm left   hilar  lymphadenopathy with a 0.6 x 0.5 mm left upper lobe solid   pulmonary nodule that appears tree-in-bud like; TTE done which   showed no significant valvular abnormalities were noted and no   vegetation or mass identified.    Patient reports having left upper chest pain for the last few   days as well as a non-productive cough. The chest pain has been   becoming more persistent these last few days. He has had night   sweats every night since COVID-19 positive test. No recent weight   loss and no fevers or chills prior to the positive COVID-19 test.   No nausea, vomiting, or diarrhea. He has had these rashes/pimples   appear on his face, neck, and chest for the last few weeks which   is new for him. He notes that he does have hip pain that started   a couple days ago without an injury to it (negative hip XR); no   other joint swelling or pain. His significant other states that   she was exposed to someone a few months ago who was having an   active TB infection; no other known sick contacts. He has no past   medical history stating that he has tested negative for TB in the   past and has never been treated for it. No family history of   cancer or lung disease.    Patient is a never smoker. Patient reports no recreational drug   use.   Works as a ; does not drive animals or chemical.   Denies exposures to smoke, mold, and animals.           Review of Symptoms:     10-point ROS reviewed, & found negative w/ exceptions noted in   the HPI.          Past Medical History:     No past medical history on file.    No past surgical history on file.         Allergies:     Allergies   Allergen Reactions    Olopatadine Other (See Comments)     Worsening of conjunctivitis             Outpatient Medications:     No current facility-administered medications on file prior to   encounter.  ibuprofen (ADVIL/MOTRIN) 600 MG tablet, Take 1 tablet (600 mg) by   mouth every 8 hours as needed for moderate pain  UNKNOWN TO PATIENT,  Place 1 drop Into the left eye 3 times daily   Unknown eye med drop              Family History:   No family history on file.            Social History:     Social History     Tobacco Use    Smoking status: Never Smoker    Smokeless tobacco: Never Used   Substance Use Topics    Alcohol use: Not Currently    Drug use: Not Currently             Physical Exam:   /79   Pulse 52   Temp 97.8  F (36.6  C)   Resp 16   Wt   79.4 kg (175 lb)   SpO2 99%   BMI 23.73 kg/m      General: Alert, interactive, laying comfortably in bed  HENT: Sclera anicteric. No nose or mouth ulcers. No palpable head   or neck lymphadenopathy.    Lungs: Clear to auscultation bilaterally, no wheezing, on room   air and no increased work of breathing  Heart: Regular rate and rhythm, no murmurs  Abdomen: Soft, nontender, nondistended  Extremities: No lower extremity edema, no palpable axillary   adenopathy  Skin: Papular sub-centimeter skin lesions with evidence of   exoriation in various stages of healing to the left middle jaw   line, upper mid-chest and left upper chest.   Neurologic: Alert, interactive, answers questions appropriately,   nonfocal exam          Data:   Labs (all laboratory studies reviewed by me): notable labs in HPI   above.    Imaging and other diagnostic testing (all imaging studies   reviewed by me)    Chest xray:  XR CHEST PORT 1 VIEW 1/20/2022   IMPRESSION: There are infiltrates in both lung bases. Heart size   within normal limits.      CT chest:  CT CHEST PULMONARY EMBOLISM W CONTRAST 1/31/2022 2:58 AM  IMPRESSION:  1.  No pulmonary embolus, aortic dissection, or aneurysm.  2.  New 3.7 x 2.4 cm left hilar lymphadenopathy with a 0.6 x 0.5   mm left upper lobe solid pulmonary nodule that appears   tree-in-bud like. Diagnostic considerations include infectious,   inflammatory, and malignant etiologies to include tuberculosis.   Recommend follow-up to resolution.      PFTs:  None in chart to date.       Transthoracic  echocardiogram:    1/31/22  Interpretation Summary  No significant valvular abnormalities were noted. No vegetation   or mass  identified, however this does not exclude endocarditis.    Left Ventricle  Global and regional left ventricular function is normal with an   EF of 55-60%.  Left ventricular size is normal. Left ventricular diastolic   function is  normal.     Right Ventricle  Right ventricular function, chamber size, wall motion, and   thickness are  normal.     Atria  Both atria appear normal.     Mitral Valve  The mitral valve is normal.     Aortic Valve  Aortic valve is normal in structure and function. The aortic   valve is  tricuspid.     Tricuspid Valve  The tricuspid valve is normal. Mild tricuspid insufficiency is   present. The  right ventricular systolic pressure is approximated at 21.5 mmHg   plus the  right atrial pressure. Pulmonary artery systolic pressure is   normal.     Pulmonic Valve  The pulmonic valve is normal. Trace pulmonic insufficiency is   present.     Vessels  The aorta root is normal. The thoracic aorta is normal. The   pulmonary artery  cannot be assessed. The inferior vena cava was normal in size   with preserved  respiratory variability.     Pericardium  No pericardial effusion is present.     Compared to Previous Study  Previous study not available for comparison.         Dr. Tripp staffed the patient.    Staff Involved:  Resident/Staff

## 2022-01-31 NOTE — ED NOTES
[facility]   ED Nurse to Floor Handoff     Jessica Farnsworth is a 32 year old male who speaks Venezuelan and lives with a spouse,  in a home  They arrived in the ED by car from home    ED Chief Complaint: Leg Pain    ED Dx;   Final diagnoses:   Chest pain, unspecified type   Pulmonary nodules   Hip pain, right         Needed?: No    Allergies:   Allergies   Allergen Reactions     Olopatadine Other (See Comments)     Worsening of conjunctivitis   .  Past Medical Hx: No past medical history on file.   Baseline Mental status: WDL  Current Mental Status changes: at basesline    Infection present or suspected this encounter: yes respiratory  Sepsis suspected: No  Isolation type: Airborne  Patient tested for COVID 19 prior to admission: NO     Activity level - Baseline/Home:  Independent  Activity Level - Current:   Independent    Bariatric equipment needed?: No    In the ED these meds were given:   Medications   lidocaine 1 % 0.1-1 mL (has no administration in time range)   lidocaine (LMX4) cream (has no administration in time range)   sodium chloride (PF) 0.9% PF flush 3 mL (has no administration in time range)   sodium chloride (PF) 0.9% PF flush 3 mL (has no administration in time range)   melatonin tablet 1 mg (has no administration in time range)   polyethylene glycol (MIRALAX) Packet 17 g (has no administration in time range)   ondansetron (ZOFRAN-ODT) ODT tab 4 mg (has no administration in time range)     Or   ondansetron (ZOFRAN) injection 4 mg (has no administration in time range)   prochlorperazine (COMPAZINE) injection 10 mg (has no administration in time range)     Or   prochlorperazine (COMPAZINE) tablet 10 mg (has no administration in time range)     Or   prochlorperazine (COMPAZINE) suppository 25 mg (has no administration in time range)   iopamidol (ISOVUE-370) solution 107 mL (107 mLs Intravenous Given 1/31/22 0311)   sodium chloride 0.9 % bag 500mL  for CT scan flush use (90 mLs Intravenous Given 1/31/22 0311)   acetaminophen (TYLENOL) tablet 1,000 mg (1,000 mg Oral Given 1/31/22 0428)   ketorolac (TORADOL) injection 30 mg (30 mg Intravenous Given 1/31/22 0429)       Drips running?  No    Home pump  No    Current LDAs  Peripheral IV 01/31/22 Anterior;Left (Active)   Number of days: 0       Labs results:   Labs Ordered and Resulted from Time of ED Arrival to Time of ED Departure   LACTIC ACID WHOLE BLOOD - Abnormal       Result Value    Lactic Acid 0.6 (*)    CRP INFLAMMATION - Abnormal    CRP Inflammation 11.0 (*)    ERYTHROCYTE SEDIMENTATION RATE AUTO - Abnormal    Erythrocyte Sedimentation Rate 20 (*)    COMPREHENSIVE METABOLIC PANEL - Normal    Sodium 135      Potassium 3.8      Chloride 105      Carbon Dioxide (CO2) 27      Anion Gap 3      Urea Nitrogen 9      Creatinine 0.80      Calcium 9.4      Glucose 95      Alkaline Phosphatase 76      AST 20      ALT 47      Protein Total 8.8      Albumin 4.0      Bilirubin Total 0.3      GFR Estimate >90     TROPONIN I - Normal    Troponin I High Sensitivity 4     CBC WITH PLATELETS AND DIFFERENTIAL    WBC Count 7.0      RBC Count 4.99      Hemoglobin 14.9      Hematocrit 43.7      MCV 88      MCH 29.9      MCHC 34.1      RDW 12.7      Platelet Count 278      % Neutrophils 53      % Lymphocytes 36      % Monocytes 6      % Eosinophils 4      % Basophils 1      % Immature Granulocytes 0      NRBCs per 100 WBC 0      Absolute Neutrophils 3.7      Absolute Lymphocytes 2.6      Absolute Monocytes 0.4      Absolute Eosinophils 0.3      Absolute Basophils 0.1      Absolute Immature Granulocytes 0.0      Absolute NRBCs 0.0     BLOOD CULTURE   BLOOD CULTURE   AFB CULTURE AND STAIN NON BLOOD   QUANTIFERON TB GOLD PLUS GREY TUBE   QUANTIFERON TB GOLD PLUS GREEN TUBE   QUANTIFERON TB GOLD PLUS YELLOW TUBE   QUANTIFERON TB GOLD PLUS PURPLE TUBE   QUANTIFERON-TB GOLD PLUS       Imaging Studies:   Recent Results (from the past  24 hour(s))   XR Hip Right 2-3 Views    Narrative    EXAM: XR HIP RIGHT 2-3 VIEWS  LOCATION: [facility]  DATE/TIME: 1/31/2022 2:15 AM    INDICATION: Right hip pain  COMPARISON: None.      Impression    IMPRESSION: Normal joint spaces and alignment. No fracture.    CT Chest Pulmonary Embolism w Contrast    Narrative    EXAM: CT CHEST PULMONARY EMBOLISM W CONTRAST, CT ABDOMEN PELVIS W CONTRAST  LOCATION: [facility]  DATE/TIME: 1/31/2022 2:58 AM    INDICATION: PE suspected, high prob.  COMPARISON: CT chest dated 11/14/2018.  TECHNIQUE: CT chest pulmonary angiogram during arterial phase injection of IV contrast. CT abdomen and pelvis were performed during the portal venous phase. Multiplanar reformats and MIP reconstructions were performed. Dose reduction techniques were   used.   CONTRAST: 107 mL Isovue 370.     FINDINGS:  ANGIOGRAM CHEST: Pulmonary arteries are normal caliber and negative for pulmonary emboli. Thoracic aorta is negative for dissection. No CT evidence of right heart strain.    LUNGS AND PLEURA: 5.8 x 4.5 mm solid nodular density in the left upper lobe (5, 26) that appears to extend along the bronchovascular margins. No pneumothorax or pleural effusion.    MEDIASTINUM/AXILLAE: Interval development of 3.7 x 2.4 cm left hilar lymphadenopathy.    CORONARY ARTERY CALCIFICATION: None.    ABDOMEN: Normal liver, gallbladder, spleen, adrenals, and pancreas. Normal kidneys. No obstructing renal or ureteral stones. No free air or fluid. No retroperitoneal or mesenteric lymphadenopathy.    PELVIS: Normal appendix. No obstruction, colitis, or diverticulitis. No free air or fluid. Normal bladder.    MUSCULOSKELETAL: Mild multilevel discogenic degenerative change. No joint effusions. No radiographic evidence of osteomyelitis.      Impression    IMPRESSION:  1.  No pulmonary embolus, aortic dissection, or aneurysm.  2.  New 3.7  x 2.4 cm left hilar lymphadenopathy with a 0.6 x 0.5 mm left upper lobe solid pulmonary nodule that appears tree-in-bud like. Diagnostic considerations include infectious, inflammatory, and malignant etiologies to include tuberculosis.   Recommend follow-up to resolution.    Findings were discussed with Dr. Hopkins at 3:46 AM on 01/31/2021.    Imaging features are atypical or uncommonly reported for (COVID-19) pneumonia. Alternative diagnoses should be considered.   CT Abdomen Pelvis w Contrast    Narrative    EXAM: CT CHEST PULMONARY EMBOLISM W CONTRAST, CT ABDOMEN PELVIS W CONTRAST  LOCATION: [facility]  DATE/TIME: 1/31/2022 2:58 AM    INDICATION: PE suspected, high prob.  COMPARISON: CT chest dated 11/14/2018.  TECHNIQUE: CT chest pulmonary angiogram during arterial phase injection of IV contrast. CT abdomen and pelvis were performed during the portal venous phase. Multiplanar reformats and MIP reconstructions were performed. Dose reduction techniques were   used.   CONTRAST: 107 mL Isovue 370.     FINDINGS:  ANGIOGRAM CHEST: Pulmonary arteries are normal caliber and negative for pulmonary emboli. Thoracic aorta is negative for dissection. No CT evidence of right heart strain.    LUNGS AND PLEURA: 5.8 x 4.5 mm solid nodular density in the left upper lobe (5, 26) that appears to extend along the bronchovascular margins. No pneumothorax or pleural effusion.    MEDIASTINUM/AXILLAE: Interval development of 3.7 x 2.4 cm left hilar lymphadenopathy.    CORONARY ARTERY CALCIFICATION: None.    ABDOMEN: Normal liver, gallbladder, spleen, adrenals, and pancreas. Normal kidneys. No obstructing renal or ureteral stones. No free air or fluid. No retroperitoneal or mesenteric lymphadenopathy.    PELVIS: Normal appendix. No obstruction, colitis, or diverticulitis. No free air or fluid. Normal bladder.    MUSCULOSKELETAL: Mild multilevel discogenic degenerative change. No joint  effusions. No radiographic evidence of osteomyelitis.      Impression    IMPRESSION:  1.  No pulmonary embolus, aortic dissection, or aneurysm.  2.  New 3.7 x 2.4 cm left hilar lymphadenopathy with a 0.6 x 0.5 mm left upper lobe solid pulmonary nodule that appears tree-in-bud like. Diagnostic considerations include infectious, inflammatory, and malignant etiologies to include tuberculosis.   Recommend follow-up to resolution.    Findings were discussed with Dr. Hopkins at 3:46 AM on 01/31/2021.    Imaging features are atypical or uncommonly reported for (COVID-19) pneumonia. Alternative diagnoses should be considered.       Recent vital signs:   /87   Pulse 63   Temp 97.8  F (36.6  C)   Resp 16   Wt 79.4 kg (175 lb)   SpO2 98%   BMI 23.73 kg/m      Jaspreet Coma Scale Score: 15 (01/30/22 2208)       Cardiac Rhythm: Normal Sinus  Pt needs tele? No  Skin/wound Issues: None    Code Status: Full Code    Pain control: good    Nausea control: pt had none    Abnormal labs/tests/findings requiring intervention: Patient has possible TB infection on CT imaging    Family present during ED course? Yes   Family Comments/Social Situation comments: Patient lives at home with spouse; works as a .     Tasks needing completion: Needs sputum collection    JACY GRAHAM RN  [facility] -- [phone] [facility]

## 2022-01-31 NOTE — CONSULTS
[facility]   Pulmonary   Consult Note 1/31/2022  Jessica Farnsworth MRN: [id number]    We were consulted for evaluation of left upper lung nodule and left hilar lymphadenopathy seen on chest CT.     Assessment & Plan      Jessica Farnsworth is a 32 year old male with a history of a recent COVID-19 infection (positive 1/20/22) who presented to [facility] on 1/31/2022 with complaints of worsening left upper chest pain. On admission patient was diagnosed w/ left upper lung nodule and left hilar lymphadenopathy seen on chest CT.    The left upper lung nodule (0.6 cm x 0.5 cm) with the likely associated left hilar lymphadenopathy (3.7 cm x 2.4 cm) seen on chest CT today is likely infectious in nature with TB highest on the differential due to a recent exposure and night sweats. The patient has had a recent positive COVID-19 infection making him more susceptible to a superimposed bacterial vs. fungal infection. He has had subjective fevers at home but afebrile in the hospital as well as a normal CBC, pending quantiferon gold test, AFB culture and stain, and blood cultures. Still would recommend broad workup for bacterial and fungal causes. Less likely to be ongoing COVID-19 pneumonia given lack of any lung parenchymal involvement (single nodule with associated adenopathy is unusual for COVID-19).    Also on the differential is an inflammatory process; sarcoidosis being highest on that list due to the indistinct rash, right hip pain, and slightly elevated inflammatory markers (CRP 11 & ESR 20). However, normal calcium level and no other symptoms associated with sarcoidosis. He has had no rhinitis, history of asthma, or kidney disease so RA, EGPA, and GPA are also lower on the differential.      He has had B-symptoms, night sweats and 10 lbs weight loss, these last two weeks. Due to the lymphadenopathy and small nodule, presentation could be secondary to malignancy such as a new lymphoma -> no adenopathy palpable on  physical examination.    Recommendations:    - Procalcitonin    - Strep pneumo & legionella urine antigens   - Histoplasma and blastomycoses urine antigens   - Aspergillus galactomannan    - Fungitell   - Sputum sample    - Bacterial culture and gram stain    - AFB culture and stain    - Fungal culture   - Syphilis screen   - HIV screen   - Trend blood cultures   - Trend ESR & CRP   - SELINA with reflex    - ANCA with reflex   - Agree with quantiferon gold test    - Continue empiric antibiotics with anaerobic and atypical coverage   - Keep on airborne precautions due to concerns for TB infection     - We will discuss with Interventionally Pulmonary Medicine for a potential EBUS of the left hilar lymph node    - NPO at midnight    - Hold DVT prophylaxis and all NSAIDs   - Skin rash evaluation    - Consider dermatology evaluation with possible skin biopsy     We will continue to follow. Thank you for this very interesting consult.    I was present with the medical student who participated in the service and in the documentation of the note. I have verified the history and personally performed the physical exam and medical decisionmaking. I agree with the assessment and plan of care as documented in the note.    Patient seen & discussed w/  Dr. Villarreal, who agrees with the above assessment and plan.    Edgardo Robles M.D.  Pulmonary and Critical Care Medicine Fellow  1/31/2022          History of Present Illness:     Jessica Farnsworth is a 32 year old male with a history of a recent COVID-19 infection (positive 1/20/22) and a potential latent TB (treated 2017?) and who presented to [facility] on 1/31/2022 with complaints of worsening left upper chest pain.  On admission patient was diagnosed w/ left upper lung nodule and left hilar lymphadenopathy seen on chest CT.    Per chart review, the patient went to the [facility] ED on 1/20/22 due to left chest and shoulder pain. His EKG was normal, troponin negative, normal ProBNP (14), BMP  with a glucose of 123 otherwise WNL, and his CXR showed infiltrates in both lung bases otherwise normal. At that time, he tested positive for COVID-19 [negative for Influenza A/B and RSV) and was sent home to quarantine and rest (also advised getting the COVID-19 vaccine when appropriate).     In the ED, CBC WNL, CMP WNL, lactate slightly low, CRP slightly elevated at 11, ESR elevated at 20, and a normal troponin. His blood cultures, quantiferon, and AFB culture and stain are pending. For imaging, his chest CT PE (1/31) showed no pulmonary embolus, aortic dissection, or aneurysm but new 3.7 x 2.4 cm left hilar lymphadenopathy with a 0.6 x 0.5 mm left upper lobe solid pulmonary nodule that appears tree-in-bud like; TTE done which showed no significant valvular abnormalities were noted and no vegetation or mass identified.    Patient reports having left upper chest pain for the last few days as well as a non-productive cough. The chest pain has been becoming more persistent these last few days. He has had night sweats every night since COVID-19 positive test. No recent weight loss and no fevers or chills prior to the positive COVID-19 test. No nausea, vomiting, or diarrhea. He has had these rashes/pimples appear on his face, neck, and chest for the last few weeks which is new for him. He notes that he does have hip pain that started a couple days ago without an injury to it (negative hip XR); no other joint swelling or pain. His significant other states that she was exposed to someone a few months ago who was having an active TB infection; no other known sick contacts. He has no past medical history stating that he has tested negative for TB in the past and has never been treated for it. No family history of cancer or lung disease.    Patient is a never smoker. Patient reports no recreational drug use.   Works as a ; does not drive animals or chemical.   Denies exposures to smoke, mold, and animals.            Review of Symptoms:     10-point ROS reviewed, & found negative w/ exceptions noted in the HPI.          Past Medical History:     No past medical history on file.    No past surgical history on file.         Allergies:     Allergies   Allergen Reactions     Olopatadine Other (See Comments)     Worsening of conjunctivitis             Outpatient Medications:     No current facility-administered medications on file prior to encounter.  ibuprofen (ADVIL/MOTRIN) 600 MG tablet, Take 1 tablet (600 mg) by mouth every 8 hours as needed for moderate pain  UNKNOWN TO PATIENT, Place 1 drop Into the left eye 3 times daily Unknown eye med drop              Family History:   No family history on file.            Social History:     Social History     Tobacco Use     Smoking status: Never Smoker     Smokeless tobacco: Never Used   Substance Use Topics     Alcohol use: Not Currently     Drug use: Not Currently             Physical Exam:   /79   Pulse 52   Temp 97.8  F (36.6  C)   Resp 16   Wt 79.4 kg (175 lb)   SpO2 99%   BMI 23.73 kg/m      General: Alert, interactive, laying comfortably in bed  HENT: Sclera anicteric. No nose or mouth ulcers. No palpable head or neck lymphadenopathy.    Lungs: Clear to auscultation bilaterally, no wheezing, on room air and no increased work of breathing  Heart: Regular rate and rhythm, no murmurs  Abdomen: Soft, nontender, nondistended  Extremities: No lower extremity edema, no palpable axillary adenopathy  Skin: Papular sub-centimeter skin lesions with evidence of exoriation in various stages of healing to the left middle jaw line, upper mid-chest and left upper chest.   Neurologic: Alert, interactive, answers questions appropriately, nonfocal exam          Data:   Labs (all laboratory studies reviewed by me): notable labs in HPI above.    Imaging and other diagnostic testing (all imaging studies reviewed by me)    Chest xray:  XR CHEST PORT 1 VIEW 1/20/2022   IMPRESSION: There  are infiltrates in both lung bases. Heart size within normal limits.      CT chest:  CT CHEST PULMONARY EMBOLISM W CONTRAST 1/31/2022 2:58 AM  IMPRESSION:  1.  No pulmonary embolus, aortic dissection, or aneurysm.  2.  New 3.7 x 2.4 cm left hilar lymphadenopathy with a 0.6 x 0.5 mm left upper lobe solid pulmonary nodule that appears tree-in-bud like. Diagnostic considerations include infectious, inflammatory, and malignant etiologies to include tuberculosis.   Recommend follow-up to resolution.      PFTs:  None in chart to date.       Transthoracic echocardiogram:    1/31/22  Interpretation Summary  No significant valvular abnormalities were noted. No vegetation or mass  identified, however this does not exclude endocarditis.    Left Ventricle  Global and regional left ventricular function is normal with an EF of 55-60%.  Left ventricular size is normal. Left ventricular diastolic function is  normal.     Right Ventricle  Right ventricular function, chamber size, wall motion, and thickness are  normal.     Atria  Both atria appear normal.     Mitral Valve  The mitral valve is normal.     Aortic Valve  Aortic valve is normal in structure and function. The aortic valve is  tricuspid.     Tricuspid Valve  The tricuspid valve is normal. Mild tricuspid insufficiency is present. The  right ventricular systolic pressure is approximated at 21.5 mmHg plus the  right atrial pressure. Pulmonary artery systolic pressure is normal.     Pulmonic Valve  The pulmonic valve is normal. Trace pulmonic insufficiency is present.     Vessels  The aorta root is normal. The thoracic aorta is normal. The pulmonary artery  cannot be assessed. The inferior vena cava was normal in size with preserved  respiratory variability.     Pericardium  No pericardial effusion is present.     Compared to Previous Study  Previous study not available for comparison.

## 2022-02-01 LAB
ANA PAT SER IF-IMP: ABNORMAL
ANA SER QL IF: POSITIVE
ANA TITR SER IF: ABNORMAL {TITER}
ANCA AB PATTERN SER IF-IMP: NORMAL
C-ANCA TITR SER IF: NORMAL {TITER}
CRP SERPL-MCNC: 12 MG/L (ref 0–8)
ERYTHROCYTE [DISTWIDTH] IN BLOOD BY AUTOMATED COUNT: 12.5 % (ref 10–15)
GAMMA INTERFERON BACKGROUND BLD IA-ACNC: 0.34 IU/ML
HCT VFR BLD AUTO: 41.5 % (ref 40–53)
HGB BLD-MCNC: 14 G/DL (ref 13.3–17.7)
HIV 1+2 AB+HIV1 P24 AG SERPL QL IA: NONREACTIVE
L PNEUMO1 AG UR QL IA: NEGATIVE
M TB IFN-G BLD-IMP: POSITIVE
M TB IFN-G CD4+ BCKGRND COR BLD-ACNC: 9.66 IU/ML
MCH RBC QN AUTO: 30 PG (ref 26.5–33)
MCHC RBC AUTO-ENTMCNC: 33.7 G/DL (ref 31.5–36.5)
MCV RBC AUTO: 89 FL (ref 78–100)
MITOGEN IGNF BCKGRD COR BLD-ACNC: 9.66 IU/ML
MITOGEN IGNF BCKGRD COR BLD-ACNC: 9.66 IU/ML
PLATELET # BLD AUTO: 242 10E3/UL (ref 150–450)
QUANTIFERON MITOGEN: 10 IU/ML
QUANTIFERON NIL TUBE: 0.34 IU/ML
QUANTIFERON TB1 TUBE: 10 IU/ML
QUANTIFERON TB2 TUBE: 10
RBC # BLD AUTO: 4.67 10E6/UL (ref 4.4–5.9)
S PNEUM AG SPEC QL: NEGATIVE
T PALLIDUM AB SER QL: NONREACTIVE
WBC # BLD AUTO: 5.6 10E3/UL (ref 4–11)

## 2022-02-01 PROCEDURE — 99207 PR CDG-MDM COMPONENT: MEETS MODERATE - DOWN CODED: CPT | Performed by: STUDENT IN AN ORGANIZED HEALTH CARE EDUCATION/TRAINING PROGRAM

## 2022-02-01 PROCEDURE — 36415 COLL VENOUS BLD VENIPUNCTURE: CPT | Performed by: STUDENT IN AN ORGANIZED HEALTH CARE EDUCATION/TRAINING PROGRAM

## 2022-02-01 PROCEDURE — 258N000003 HC RX IP 258 OP 636: Performed by: STUDENT IN AN ORGANIZED HEALTH CARE EDUCATION/TRAINING PROGRAM

## 2022-02-01 PROCEDURE — 85027 COMPLETE CBC AUTOMATED: CPT | Performed by: STUDENT IN AN ORGANIZED HEALTH CARE EDUCATION/TRAINING PROGRAM

## 2022-02-01 PROCEDURE — 250N000011 HC RX IP 250 OP 636: Performed by: STUDENT IN AN ORGANIZED HEALTH CARE EDUCATION/TRAINING PROGRAM

## 2022-02-01 PROCEDURE — 250N000013 HC RX MED GY IP 250 OP 250 PS 637: Performed by: PEDIATRICS

## 2022-02-01 PROCEDURE — 86140 C-REACTIVE PROTEIN: CPT | Performed by: STUDENT IN AN ORGANIZED HEALTH CARE EDUCATION/TRAINING PROGRAM

## 2022-02-01 PROCEDURE — 120N000002 HC R&B MED SURG/OB UMMC

## 2022-02-01 PROCEDURE — 99232 SBSQ HOSP IP/OBS MODERATE 35: CPT | Performed by: STUDENT IN AN ORGANIZED HEALTH CARE EDUCATION/TRAINING PROGRAM

## 2022-02-01 PROCEDURE — 87206 SMEAR FLUORESCENT/ACID STAI: CPT | Performed by: STUDENT IN AN ORGANIZED HEALTH CARE EDUCATION/TRAINING PROGRAM

## 2022-02-01 PROCEDURE — 999N000127 HC STATISTIC PERIPHERAL IV START W US GUIDANCE

## 2022-02-01 RX ORDER — IBUPROFEN 200 MG
200 TABLET ORAL EVERY 6 HOURS PRN
Status: CANCELLED | OUTPATIENT
Start: 2022-02-01

## 2022-02-01 RX ADMIN — CLINDAMYCIN PHOSPHATE: 10 LOTION TOPICAL at 20:15

## 2022-02-01 RX ADMIN — AZITHROMYCIN MONOHYDRATE 500 MG: 500 INJECTION, POWDER, LYOPHILIZED, FOR SOLUTION INTRAVENOUS at 15:39

## 2022-02-01 RX ADMIN — CLINDAMYCIN PHOSPHATE: 10 LOTION TOPICAL at 08:19

## 2022-02-01 RX ADMIN — AMPICILLIN SODIUM AND SULBACTAM SODIUM 3 G: 2; 1 INJECTION, POWDER, FOR SOLUTION INTRAMUSCULAR; INTRAVENOUS at 03:17

## 2022-02-01 RX ADMIN — AMPICILLIN SODIUM AND SULBACTAM SODIUM 3 G: 2; 1 INJECTION, POWDER, FOR SOLUTION INTRAMUSCULAR; INTRAVENOUS at 08:19

## 2022-02-01 RX ADMIN — AMPICILLIN SODIUM AND SULBACTAM SODIUM 3 G: 2; 1 INJECTION, POWDER, FOR SOLUTION INTRAMUSCULAR; INTRAVENOUS at 14:57

## 2022-02-01 RX ADMIN — AMPICILLIN SODIUM AND SULBACTAM SODIUM 3 G: 2; 1 INJECTION, POWDER, FOR SOLUTION INTRAMUSCULAR; INTRAVENOUS at 20:12

## 2022-02-01 RX ADMIN — ACETAMINOPHEN 975 MG: 325 TABLET, FILM COATED ORAL at 05:55

## 2022-02-01 RX ADMIN — BENZOYL PEROXIDE: 50 GEL TOPICAL at 20:14

## 2022-02-01 RX ADMIN — ACETAMINOPHEN 975 MG: 325 TABLET, FILM COATED ORAL at 20:12

## 2022-02-01 ASSESSMENT — ACTIVITIES OF DAILY LIVING (ADL)
ADLS_ACUITY_SCORE: 5

## 2022-02-01 NOTE — PLAN OF CARE
Time 8837-0298     Reason for admission: Pulmonary nodules [R91.8]  Hip pain, right [M25.551]  Chest pain, unspecified type [R07.9]     Vitals:  /73 (BP Location: Right arm)   Pulse 76   Temp 98.2  F (36.8  C) (Oral)   Resp 16   Wt 79.4 kg (175 lb)   SpO2 99%   BMI 23.73 kg/m      This shift:   - Second AFB sputum sample sent to lab, third tomorrow  - Ambulated in room x1 this shift  - Review flowsheets for more information    Neuro: WDL x numbness and tingling noted to R leg, beginning about 4 days ago. Team aware. States it is limiting his mobility.   Cardiac: WDL  Respiratory: Tachypnec. Clear to all bases.   GI: LBM 1/30. Abdomen firm. Initated back to reg diet from NPO today. Declining bowel management meds at this time.   : WDL  Skin/Wounds: Folliculitis rash to face and chest  Activity: Ind/SBA. C/o difficulty ambulating due to R leg numbness  Pain: pain 4/10 rating, pleuritic to left chest and aching and numb to right hip. Pt offered nonpharmacologic pain management measures, accepting of heat and repositioning. Declines pain management medications intermittently.   Lines: PIV. Lines c/d/i, patent. saline locked     Diet: Orders Placed This Encounter      Regular Diet Adult     Recent Labs   Lab 01/31/22  0111   GLC 95        Plan: Continue to monitor and follow POC. Third AFB sputum sample due for collection tomorrow. Per pulm team, deferring endobronchial ultrasound if/when 2-3 AFB cultures are negative. Infectious workup still pending.      BG:   Glucose Values Bedside Glucose (mg/dl )  GLUCOSE   Latest Ref Rng & Units - 70 - 99 mg/dL   1/31/2022 -- 95   1/20/2022 -- 123(H)   Some recent data might be hidden      Labs/Lactic:  Hemoglobin (g/dL)   Date Value   02/01/2022 14.0     Creatinine (mg/dL)   Date Value   01/31/2022 0.80     Platelet Count (10e3/uL)   Date Value   02/01/2022 242     Hematocrit (%)   Date Value   02/01/2022 41.5     WBC Count (10e3/uL)   Date Value   02/01/2022  5.6     Potassium (mmol/L)   Date Value   01/31/2022 3.8     Sodium (mmol/L)   Date Value   01/31/2022 135     Calcium (mg/dL)   Date Value   01/31/2022 9.4     Lactic Acid (mmol/L)   Date Value   01/31/2022 0.6 (L)

## 2022-02-01 NOTE — PLAN OF CARE
Alert, oreintated, VSS, minimal engish, Greek speaking, wife at bedside most of day, pt remains in airborne iso for rule out TB, continues to have dull chest pain and left hip, aqua K for pain relief, reports some BROWNLEE, sats 100% on RA, lungs clear, NPO for possible bronch ultrasound tomorrow, AFB sputum pending, IV abx via left arm PIV

## 2022-02-01 NOTE — PROGRESS NOTES
Physician Attestation   I, Philip Wright, was present with the medical/IDALIA student who participated in the service and in the documentation of the note.  I have verified the history and personally performed the physical exam and medical decision making.  I agree with the assessment and plan of care as documented in the note.      I personally reviewed vital signs, medications, labs and imaging.    Await, additional AFB cultures. Initial plan today was for bronchoscopy but plan to await additional AFB results per pulmonary. Continue IV antibiotics in the interim.     Philip Wright MD  Date of Service (when I saw the patient): 02/01/22      [facility]    Progress Note - Hospitalist Service, GOLD TEAM 11       Date of Admission:  1/31/2022    Assessment & Plan     Jessica Farnsworth is a 32 year old male w h/o latent TB (tx 2017) and h/o H pylori (2018) who was admitted for left upper chest pain on 1/31/2022 with nodule and hilar adenopathy noted on CT c/f infection.     Changes today:  - NPO for possible EBUS today. Per pulmonology team, will defer and re-discuss if / when 2-3 AFB cultures are negative. Diet changed to regular  - Infectious work up pending     Left upper lobe pulmonary nodule  Left hilar lymphadenopathy  Recent confirmed COVID-19 infection (1/20)   History of latent tuberculosis, treated (2017)  Pleuritic chest pain  Pt w known h/o latent TB tx in 2017, immigrant from [place]. Presented to ED with upper left chest pain, intermittently pleuritic and radiating to the back. Imaging s/f new L hilar LAD with a LISSET solid pulmonary nodule that appears tree-in-bud like. Ddx: infectious (bacterial vs viral in setting of COVID + 1/20/22) vs sarcoidosis vs malignancy vs lymphoma. WBC wnl, not suggestive of occult bacterial infection. Could be TB given rcnt exposure. AFB pending.   - Pulmonary consulted, appreciate assistance.    - Micro:    -  Strep pneumo: negative     - legionella: negative     - histo, blasto, galactomannan, fungitell: pending     - Sputum cx: pending     - RPR: negative      - ANCA: pending     - HIV: negative     - BCX 1/31: NGTD     - SELINA (+) 1:160     - AFB: pending. Quant GOLD: positive    - Possible EBUS today 2/1    - NPO at midnight    - Airborne precautions for TB rule out  - Continue Unasyn and Azithromycin for empiric coverage     Folliculitis  Pt with papular rash on chest. Derm consulted, rash c/w folliculitis. Topicals prescribed per their recommendations (see note 1/31 for full details). Subjective improvement with topicals   - Continue topical treatment       Confirmed COVID-19 infection    Recovered COVID      Symptom Onset unknown    Date of 1st Positive Test 1/20/2022   Vaccination Status Partially Vaccinated       - COVID-19 special precautions, continuous pulse-ox  - Oxygen: continue current support with RA; titrate to keep SpO2 between 90-96%  - Labs: Clinically improving, will check labs as needed.   - Imaging: no additional imaging needed at this time  - Breathing treatments: no inhalers needed; avoid nebulizers in favor of MDIs   - IV fluids: not indicated at this time  - Antibiotics: not indicated   - COVID-Focused Medications: No COVID-specific therapies are appropriate at this time.  - DVT Prophylaxis: at high risk of thrombotic complications due to COVID-19 (DDimer = N/A ).          - not indicated        - consider anticoag on discharge for 30 days & until return to normal mobility     Right hip pain, likely MSK - tendonitis vs bursitis  Patient noted radiating right hip pain on initial exam in ED. Labs reassuring against septic joint. XR w/o fracture. On exam, normal ROM with mild pain.  No fracture on plain film. Normal range of motion, though mildly painful. Today, noted mild pain around hip flexor. Tried hot pack with relief.        Diet: NPO per Anesthesia Guidelines for Procedure/Surgery Except for:  Meds, Ice Chips    DVT Prophylaxis: Ambulate every shift  Diaz Catheter: Not present  Fluids: N/A   Central Lines: None  Cardiac Monitoring: None  Code Status: Full Code      Disposition Plan   Expected Discharge: 02/04/2022   Anticipated discharge location:  Awaiting care coordination huddle  Delays:     Isolation  Lab Result Pending          The patient's care was discussed with the Attending Physician, Dr. Wright, Bedside Nurse, Patient and pulmonology Consultant.    Laurel Patel  Medical Student  Hospitalist Service, GOLD TEAM [address] [place] [address]  Securely message with the Vocera Web Console (learn more here)  Text page via [facility] Paging/Directory   Please see signed in provider for up to date coverage information      Clinically Significant Risk Factors Present on Admission               ______________________________________________________________________    Interval History   Nursing and staff notes reviewed. No acute events overnight. Breathing well on room air. Wife with him at bedside. Getting hungry while NPO. Clarified that wife was exposure to TB who was visiting someone in the hospital who had TB. Mild, intermittent right hip pain, want to try heat pack first. No SOB, improving chest pain. 4 point ROS otherwise negative.    Data reviewed today: I reviewed all medications, new labs and imaging results over the last 24 hours. I personally reviewed the chest CT image(s) showing new hilar LAD and LISSET pulmonary nodule. Labs and Imaging reviewed in Epic, pertinent discussed in A&P.       Physical Exam   Vital Signs: Temp: 98.2  F (36.8  C) Temp src: Oral BP: 114/73 Pulse: 76   Resp: 16 SpO2: 99 % O2 Device: None (Room air)    Weight: 175 lbs 0 oz  Physical Exam  Constitutional:       General: He is not in acute distress.     Appearance: He is not ill-appearing.   Cardiovascular:      Rate and Rhythm: Normal rate and regular rhythm.      Heart sounds: Normal heart  sounds. No murmur heard.      Pulmonary:      Effort: Pulmonary effort is normal.      Breath sounds: Normal breath sounds.   Abdominal:      General: Abdomen is flat. Bowel sounds are normal.      Palpations: Abdomen is soft.   Musculoskeletal:      Right lower leg: No edema.      Left lower leg: No edema.   Neurological:      Mental Status: He is alert.       Data   Recent Labs   Lab 02/01/22  0644 01/31/22  0111   WBC 5.6 7.0   HGB 14.0 14.9   MCV 89 88    278   NA  --  135   POTASSIUM  --  3.8   CHLORIDE  --  105   CO2  --  27   BUN  --  9   CR  --  0.80   ANIONGAP  --  3   RADHA  --  9.4   GLC  --  95   ALBUMIN  --  4.0   PROTTOTAL  --  8.8   BILITOTAL  --  0.3   ALKPHOS  --  76   ALT  --  47   AST  --  20

## 2022-02-01 NOTE — PROGRESS NOTES
Care Management Follow Up    Length of Stay (days): 1    Expected Discharge Date: 02/04/2022     Concerns to be Addressed: establishing a PCP       Additional Information:  Writer called the patient's wife, Inez, who is currently at the bedside and translating for her . Writer introduced the RNCC and role and asked if a PCP could be established for them on their behalf. Wife agreed  and confirmed she would like a PCP established within the [employer] system at the [facility].     A [facility] request has been placed. Care management will continue to follow and support discharge planning as needed.      JavadBethanydir Win MRN: [id number]  Date: 2/8/2022 Status: Scheduled  Time: 3:20 PM Length: 20  Visit Type: ED/HOSP FOLLOW UP [2891] Copay: $0.00  Provider: Francesco Fontana MD Department:  PRIMARY CARE      Jeri Hernández RN  RNCC [place]

## 2022-02-01 NOTE — PLAN OF CARE
Pt admitted from [facility], minimal engish, Cambodian speaking, wife at bedside, pt in airborne iso for rule out TB, states chest pain is better, reports some BROWNLEE, sats 100% on RA, lungs clear, ate dinner but pt aware he is NPO at 000 for possible bronch ultrasound tomorrow, AFB sputum pending, admission paperwork completed, IV abx via left arm PIV

## 2022-02-01 NOTE — PROGRESS NOTES
SPIRITUAL HEALTH SERVICES  SPIRITUAL ASSESSMENT Progress Note  [facility]U U5A     REFERRAL SOURCE: Lead     I met the pt in his room sitting on his bed. He greeted me in Islamic greetings. Pt welcome the [facility] support. He mentioned that he is afraid of the autopsy he is going to have on his lungs that has been damaged by Covid. We prayed together for his fear. Lou Lorenzo kirill him the strength.     PLAN: [facility] will remain Available for their duration of stay    Inés Chopra Resident  Phone: [phone]

## 2022-02-01 NOTE — PLAN OF CARE
[id number]  Status: Admitted for chest pain. Found upper lobe pulmonary nodule-left hilar lymphnode. HX: of latent TB and was recently around someone with TB-Airborne precautions until rule out.   Vitals: VSS, on RA.   Neuros: Intact, A&Ox4. Brazilian speaking but does understand some english.  is needed for assessments.    IV: PIV SL in between abx.   Labs/Electrolytes: Waiting for TB results. Trop negative.   Resp/trach: LS clear on RA. Denies SOB.   Diet: Regular. NPO at 0000.   Bowel status: No BM this shift.   : Voids spontaneously without problem.   Skin: Rash/acne on chest and face- dermatology prescribed topicals. Applied per MAR.   Pain: C/o 6/10 upper left chest pain that radiates down LUE. Pt also c/o of hip pain. PRN Tylenol was given with some relief.   Activity: Up ad parker.   Social: Girlfriend or wife at bedside.   Plan: Bronchial ultra sound and biopsy in the AM.    Updates this shift: Pt preferred girlfriend/wife to interprete for him. C/o chest pain and hip pain, PRN tylenol given. Abx given per MAR.

## 2022-02-02 LAB
1,3 BETA GLUCAN SER-MCNC: <31 PG/ML
ALBUMIN SERPL-MCNC: 3.1 G/DL (ref 3.4–5)
ALP SERPL-CCNC: 60 U/L (ref 40–150)
ALT SERPL W P-5'-P-CCNC: 32 U/L (ref 0–70)
ANION GAP SERPL CALCULATED.3IONS-SCNC: 7 MMOL/L (ref 3–14)
AST SERPL W P-5'-P-CCNC: 17 U/L (ref 0–45)
BACTERIA SPT CULT: NORMAL
BASOPHILS # BLD AUTO: 0 10E3/UL (ref 0–0.2)
BASOPHILS NFR BLD AUTO: 0 %
BILIRUB DIRECT SERPL-MCNC: <0.1 MG/DL (ref 0–0.2)
BILIRUB SERPL-MCNC: 0.5 MG/DL (ref 0.2–1.3)
BUN SERPL-MCNC: 8 MG/DL (ref 7–30)
CALCIUM SERPL-MCNC: 9.6 MG/DL (ref 8.5–10.1)
CHLORIDE BLD-SCNC: 108 MMOL/L (ref 94–109)
CO2 SERPL-SCNC: 24 MMOL/L (ref 20–32)
CREAT SERPL-MCNC: 0.65 MG/DL (ref 0.66–1.25)
EOSINOPHIL # BLD AUTO: 0.2 10E3/UL (ref 0–0.7)
EOSINOPHIL NFR BLD AUTO: 3 %
ERYTHROCYTE [DISTWIDTH] IN BLOOD BY AUTOMATED COUNT: 12.5 % (ref 10–15)
GALACTOMANNAN AG SERPL QL IA: NEGATIVE
GALACTOMANNAN AG SPEC IA-ACNC: 0.12
GFR SERPL CREATININE-BSD FRML MDRD: >90 ML/MIN/1.73M2
GLUCOSE BLD-MCNC: 108 MG/DL (ref 70–99)
GRAM STAIN RESULT: NORMAL
HCT VFR BLD AUTO: 39.8 % (ref 40–53)
HGB BLD-MCNC: 13.6 G/DL (ref 13.3–17.7)
IMM GRANULOCYTES # BLD: 0 10E3/UL
IMM GRANULOCYTES NFR BLD: 0 %
LYMPHOCYTES # BLD AUTO: 1.9 10E3/UL (ref 0.8–5.3)
LYMPHOCYTES NFR BLD AUTO: 26 %
MAGNESIUM SERPL-MCNC: 2 MG/DL (ref 1.6–2.3)
MCH RBC QN AUTO: 30.3 PG (ref 26.5–33)
MCHC RBC AUTO-ENTMCNC: 34.2 G/DL (ref 31.5–36.5)
MCV RBC AUTO: 89 FL (ref 78–100)
MONOCYTES # BLD AUTO: 0.4 10E3/UL (ref 0–1.3)
MONOCYTES NFR BLD AUTO: 6 %
NEUTROPHILS # BLD AUTO: 4.6 10E3/UL (ref 1.6–8.3)
NEUTROPHILS NFR BLD AUTO: 65 %
NRBC # BLD AUTO: 0 10E3/UL
NRBC BLD AUTO-RTO: 0 /100
OBSERVATION IMP: NEGATIVE
PHOSPHATE SERPL-MCNC: 3.5 MG/DL (ref 2.5–4.5)
PLATELET # BLD AUTO: 243 10E3/UL (ref 150–450)
POTASSIUM BLD-SCNC: 4.1 MMOL/L (ref 3.4–5.3)
PROT SERPL-MCNC: 7.4 G/DL (ref 6.8–8.8)
RBC # BLD AUTO: 4.49 10E6/UL (ref 4.4–5.9)
SCANNED LAB RESULT: NORMAL
SCANNED LAB RESULT: NORMAL
SODIUM SERPL-SCNC: 139 MMOL/L (ref 133–144)
WBC # BLD AUTO: 7.1 10E3/UL (ref 4–11)

## 2022-02-02 PROCEDURE — 120N000002 HC R&B MED SURG/OB UMMC

## 2022-02-02 PROCEDURE — 36415 COLL VENOUS BLD VENIPUNCTURE: CPT | Performed by: STUDENT IN AN ORGANIZED HEALTH CARE EDUCATION/TRAINING PROGRAM

## 2022-02-02 PROCEDURE — 85025 COMPLETE CBC W/AUTO DIFF WBC: CPT | Performed by: STUDENT IN AN ORGANIZED HEALTH CARE EDUCATION/TRAINING PROGRAM

## 2022-02-02 PROCEDURE — 250N000013 HC RX MED GY IP 250 OP 250 PS 637: Performed by: STUDENT IN AN ORGANIZED HEALTH CARE EDUCATION/TRAINING PROGRAM

## 2022-02-02 PROCEDURE — 87116 MYCOBACTERIA CULTURE: CPT | Performed by: STUDENT IN AN ORGANIZED HEALTH CARE EDUCATION/TRAINING PROGRAM

## 2022-02-02 PROCEDURE — 80053 COMPREHEN METABOLIC PANEL: CPT | Performed by: STUDENT IN AN ORGANIZED HEALTH CARE EDUCATION/TRAINING PROGRAM

## 2022-02-02 PROCEDURE — 250N000013 HC RX MED GY IP 250 OP 250 PS 637: Performed by: PEDIATRICS

## 2022-02-02 PROCEDURE — 84100 ASSAY OF PHOSPHORUS: CPT | Performed by: STUDENT IN AN ORGANIZED HEALTH CARE EDUCATION/TRAINING PROGRAM

## 2022-02-02 PROCEDURE — 83735 ASSAY OF MAGNESIUM: CPT | Performed by: STUDENT IN AN ORGANIZED HEALTH CARE EDUCATION/TRAINING PROGRAM

## 2022-02-02 PROCEDURE — 87206 SMEAR FLUORESCENT/ACID STAI: CPT | Performed by: STUDENT IN AN ORGANIZED HEALTH CARE EDUCATION/TRAINING PROGRAM

## 2022-02-02 PROCEDURE — 250N000011 HC RX IP 250 OP 636: Performed by: STUDENT IN AN ORGANIZED HEALTH CARE EDUCATION/TRAINING PROGRAM

## 2022-02-02 PROCEDURE — 258N000003 HC RX IP 258 OP 636: Performed by: STUDENT IN AN ORGANIZED HEALTH CARE EDUCATION/TRAINING PROGRAM

## 2022-02-02 PROCEDURE — 99207 PR CDG-MDM COMPONENT: MEETS MODERATE - DOWN CODED: CPT | Performed by: STUDENT IN AN ORGANIZED HEALTH CARE EDUCATION/TRAINING PROGRAM

## 2022-02-02 PROCEDURE — 82248 BILIRUBIN DIRECT: CPT | Performed by: STUDENT IN AN ORGANIZED HEALTH CARE EDUCATION/TRAINING PROGRAM

## 2022-02-02 PROCEDURE — 99232 SBSQ HOSP IP/OBS MODERATE 35: CPT | Performed by: STUDENT IN AN ORGANIZED HEALTH CARE EDUCATION/TRAINING PROGRAM

## 2022-02-02 RX ORDER — LIDOCAINE 4 G/G
1 PATCH TOPICAL
Status: DISCONTINUED | OUTPATIENT
Start: 2022-02-02 | End: 2022-02-04 | Stop reason: HOSPADM

## 2022-02-02 RX ADMIN — AMPICILLIN SODIUM AND SULBACTAM SODIUM 3 G: 2; 1 INJECTION, POWDER, FOR SOLUTION INTRAMUSCULAR; INTRAVENOUS at 08:19

## 2022-02-02 RX ADMIN — AMPICILLIN SODIUM AND SULBACTAM SODIUM 3 G: 2; 1 INJECTION, POWDER, FOR SOLUTION INTRAMUSCULAR; INTRAVENOUS at 14:00

## 2022-02-02 RX ADMIN — AMPICILLIN SODIUM AND SULBACTAM SODIUM 3 G: 2; 1 INJECTION, POWDER, FOR SOLUTION INTRAMUSCULAR; INTRAVENOUS at 20:39

## 2022-02-02 RX ADMIN — AZITHROMYCIN MONOHYDRATE 500 MG: 500 INJECTION, POWDER, LYOPHILIZED, FOR SOLUTION INTRAVENOUS at 11:58

## 2022-02-02 RX ADMIN — LIDOCAINE 1 PATCH: 246 PATCH TOPICAL at 20:45

## 2022-02-02 RX ADMIN — CLINDAMYCIN PHOSPHATE: 10 LOTION TOPICAL at 20:39

## 2022-02-02 RX ADMIN — IBUPROFEN 200 MG: 200 TABLET, FILM COATED ORAL at 01:37

## 2022-02-02 RX ADMIN — AMPICILLIN SODIUM AND SULBACTAM SODIUM 3 G: 2; 1 INJECTION, POWDER, FOR SOLUTION INTRAMUSCULAR; INTRAVENOUS at 03:10

## 2022-02-02 RX ADMIN — BENZOYL PEROXIDE: 50 GEL TOPICAL at 20:52

## 2022-02-02 RX ADMIN — CLINDAMYCIN PHOSPHATE: 10 LOTION TOPICAL at 08:32

## 2022-02-02 RX ADMIN — LIDOCAINE 1 PATCH: 246 PATCH TOPICAL at 11:58

## 2022-02-02 RX ADMIN — ACETAMINOPHEN 975 MG: 325 TABLET, FILM COATED ORAL at 17:03

## 2022-02-02 ASSESSMENT — ACTIVITIES OF DAILY LIVING (ADL)
ADLS_ACUITY_SCORE: 5

## 2022-02-02 NOTE — PROGRESS NOTES
Time/Date: 2/2/22; 0700     Reason for admission:  Adm date 1/31/22. Upper left lung nodule.   Vitals:     Activity: SBA, up ad parker    Pain: 7/10, right hip pain that is dull, aching. Radiates to lower right leg. Started 5 days ago. Currently using Lidocaine patch on hip that was applied at 12:20.     Neuro: Orientated x4     Cardiac: WDL    Respiratory: diminished lower lung sounds bilaterally, both anterior and posterior. Sats 100% on RA.     GI/: WDL  Diet: Regular Adult, No Pork   Lines: Right PIV, lower arm; infusing azithromycin.   Wounds: None  Labs/imaging: AFB sputum cultures have been sent, waiting on results from Lab.       New changes this shift: Worsening right hip pain that is affecting his mobility. Consulted with provider and was given Lidocaine patch. Wife is still at bedside; still minimal English would advise using  when necessary.      Plan: Continue Airborne precautions due to concerns of TB infection. Pending AFB cultures from lab. Monitor pain and provide pain management with Lidocaine patches and heat/ice and ambulation as tolerated. Continue abx regimen.

## 2022-02-02 NOTE — PROGRESS NOTES
[id number]: Afebrile. VSS on RA. A/Ox4. Up with SBA. Pt is Tuvaluan speaking. Ipad  used for assessment. Pt able to communicate basic needs such as bathroom and pain. Pt's wife at bedside this morning and was helpful with interpreting as well. Having a lot of pain today 8/10 in R hip radiating down right leg. Pt states pain has been getting worse over the last few days. Provider is aware. Using PRN tylenol, lidocaine patch, and heat and cold with minimal relief. Regular diet with fair appetite. Eating food brought from home. Voiding adequately. Had a BM early this morning. RPIV intermittently infusing IV ampicillin. Pt had final AFB culture sent today to check for TB. Continue with airborne isolation until cultures result. Uses call light appropriately to make needs known. Will move forward with EBUS if AFB cultures are negative. Continue with POC.    Becca Lovett RN

## 2022-02-02 NOTE — PLAN OF CARE
Time [id number]     Reason for admission:   Pulmonary nodules [R91.8]  Hip pain, right [M25.551]  Chest pain, unspecified type [R07.9]      Vitals: BP 97/56 (BP Location: Right arm, Patient Position: Supine)   Pulse 66   Temp 98.1  F (36.7  C) (Oral)   Resp 16   Wt 79.4 kg (175 lb)   SpO2 99%   BMI 23.73 kg/m      Activity: Independent/standby assist   Pain: C/O R hip pain, PRN Ibuprofen and heat packs given with relief  Neuro: A/Ox4  Cardiac: WNL  Respiratory: BROWNLEE  GI/: Voids w/o issue, No BM this shift declined bowel meds due to multiple days without food  Diet: Reg  Lines: R PIV TKO  Wounds/Skin: Rash on chest, back,and face.   Labs/imaging: AFB to be collected today    This shift: Pt c/o R hip pain, PRN Ibuprofen given with heat packs.      Plan: Collect 3rd AFB, possible EBUS after AFB results.       Continue to monitor and follow POC

## 2022-02-02 NOTE — PROGRESS NOTES
[facility]    Progress Note - Hospitalist Service, GOLD TEAM 11       Date of Admission:  1/31/2022    Assessment & Plan     Jessica Farnsworth is a 32 year old male w h/o latent TB (tx 2017) and h/o H pylori (2018) who was admitted for left upper chest pain on 1/31/2022 with nodule and hilar adenopathy noted on CT c/f infection.     Changes today:  - Last AFB sample collected  -Plan for ID consult in AM  -Azithromycin completed - 3 day course  -Continue unasyn    Left upper lobe pulmonary nodule  Left hilar lymphadenopathy  Recent confirmed COVID-19 infection (1/20)   History of latent tuberculosis, treated (2017)  Pt w known h/o latent TB tx in 2017, immigrant from [place]. Presented to ED with upper left chest pain, intermittently pleuritic and radiating to the back. Imaging s/f new L hilar LAD with a LISSET solid pulmonary nodule that appears tree-in-bud like.   - Strep pneumo, legionella, histo, blasto, aspergillus, fungitell, sputum, RPR, HIV - negative ve  - SELINA (+) - 1:160,    -Ddx: infectious (TB vs bacterial vs viral in setting of COVID + 1/20/22) vs sarcoidosis vs malignancy vs lymphoma.   Plan:   - Pulmonary consulted, appreciate assistance   - Airborne precautions for TB rule out  - Continue Unasyn   -Completed azithromycin on 2/1    Folliculitis  Pt with papular rash on chest. Derm consulted, rash c/w folliculitis. Topicals prescribed per their recommendations (see note 1/31 for full details). Subjective improvement with topicals   - Continue topical treatment (clindamycin and benzyl peroxide      RecentCOVID-19 infection    Recovered COVID      Symptom Onset unknown    Date of 1st Positive Test 1/20/2022   Vaccination Status Partially Vaccinated       - COVID-19 special precautions, continuous pulse-ox  - Oxygen: continue current support with RA; titrate to keep SpO2 between 90-96%  - Labs: Clinically improving, will check labs as needed.   - Imaging:  no additional imaging needed at this time  - Breathing treatments: no inhalers needed; avoid nebulizers in favor of MDIs   - IV fluids: not indicated at this time  - Antibiotics: not indicated   - COVID-Focused Medications: No COVID-specific therapies are appropriate at this time.  - DVT Prophylaxis: at high risk of thrombotic complications due to COVID-19 (DDimer = N/A ).          - not indicated        - consider anticoag on discharge for 30 days & until return to normal mobility     Right hip pain, likely MSK - tendonitis vs bursitis  Patient noted radiating right hip pain on initial exam in ED. Labs reassuring against septic joint. XR w/o fracture. On exam, normal ROM with mild pain.  No fracture on plain film. Normal range of motion, though mildly painful. Today, noted mild pain around hip flexor. Tried hot pack with relief.   Plan:  >Lidocaine patch  >Tylenol and ibuprofen       Diet: Regular Diet Adult    DVT Prophylaxis: Ambulate every shift  Diaz Catheter: Not present  Fluids: N/A   Central Lines: None  Cardiac Monitoring: None  Code Status: Full Code      Disposition Plan   Expected Discharge: 02/04/2022   Anticipated discharge location: home with family    Delays:     Isolation  Lab Result Pending          The patient's care was discussed with the Attending Physician, Dr. Wright, Bedside Nurse, Patient and pulmonology Consultant.    Philip Wright MD  Hospitalist Service, [address]  Securely message with the Vocera Web Console (learn more here)  Text page via [facility] Paging/Directory   Please see signed in provider for up to date coverage information      Clinically Significant Risk Factors Present on Admission               ______________________________________________________________________    Interval History   Nursing and staff notes reviewed. No acute events overnight. . This morning having some hip pain which previously has resolved  with lidocaine. By time of interview this had improved. States his breathing is improving. No fever orchills. No chest pain. No shortness of breath. He was able to provide sputum samples.     Data reviewed today: I reviewed all medications, new labs and imaging results over the last 24 hours.    Physical Exam   Vital Signs: Temp: 98.4  F (36.9  C) Temp src: Oral BP: 106/71 Pulse: 66   Resp: 15 SpO2: 98 % O2 Device: None (Room air)    Weight: 175 lbs 0 oz  Physical Exam  Constitutional:       General: He is not in acute distress.     Appearance: He is not ill-appearing.   Cardiovascular:      Rate and Rhythm: Normal rate and regular rhythm.      Heart sounds: Normal heart sounds. No murmur heard.      Pulmonary:      Effort: Pulmonary effort is normal.      Breath sounds: Rales present.   Abdominal:      General: Abdomen is flat. Bowel sounds are normal.      Palpations: Abdomen is soft.   Musculoskeletal:      Right lower leg: No edema.      Left lower leg: No edema.   Neurological:      Mental Status: He is alert.       Data   Recent Labs   Lab 02/02/22  0645 02/01/22  0644 01/31/22  0111   WBC 7.1 5.6 7.0   HGB 13.6 14.0 14.9   MCV 89 89 88    242 278     --  135   POTASSIUM 4.1  --  3.8   CHLORIDE 108  --  105   CO2 24  --  27   BUN 8  --  9   CR 0.65*  --  0.80   ANIONGAP 7  --  3   RADHA 9.6  --  9.4   *  --  95   ALBUMIN 3.1*  --  4.0   PROTTOTAL 7.4  --  8.8   BILITOTAL 0.5  --  0.3   ALKPHOS 60  --  76   ALT 32  --  47   AST 17  --  20

## 2022-02-03 LAB
ANION GAP SERPL CALCULATED.3IONS-SCNC: 3 MMOL/L (ref 3–14)
BASOPHILS # BLD AUTO: 0 10E3/UL (ref 0–0.2)
BASOPHILS NFR BLD AUTO: 1 %
BUN SERPL-MCNC: 7 MG/DL (ref 7–30)
CALCIUM SERPL-MCNC: 9.3 MG/DL (ref 8.5–10.1)
CHLORIDE BLD-SCNC: 109 MMOL/L (ref 94–109)
CO2 SERPL-SCNC: 26 MMOL/L (ref 20–32)
CREAT SERPL-MCNC: 0.66 MG/DL (ref 0.66–1.25)
EOSINOPHIL # BLD AUTO: 0.2 10E3/UL (ref 0–0.7)
EOSINOPHIL NFR BLD AUTO: 4 %
ERYTHROCYTE [DISTWIDTH] IN BLOOD BY AUTOMATED COUNT: 12.6 % (ref 10–15)
GFR SERPL CREATININE-BSD FRML MDRD: >90 ML/MIN/1.73M2
GLUCOSE BLD-MCNC: 99 MG/DL (ref 70–99)
HCT VFR BLD AUTO: 40.2 % (ref 40–53)
HGB BLD-MCNC: 13.7 G/DL (ref 13.3–17.7)
IMM GRANULOCYTES # BLD: 0 10E3/UL
IMM GRANULOCYTES NFR BLD: 0 %
LYMPHOCYTES # BLD AUTO: 2.2 10E3/UL (ref 0.8–5.3)
LYMPHOCYTES NFR BLD AUTO: 40 %
MCH RBC QN AUTO: 30 PG (ref 26.5–33)
MCHC RBC AUTO-ENTMCNC: 34.1 G/DL (ref 31.5–36.5)
MCV RBC AUTO: 88 FL (ref 78–100)
MONOCYTES # BLD AUTO: 0.4 10E3/UL (ref 0–1.3)
MONOCYTES NFR BLD AUTO: 8 %
NEUTROPHILS # BLD AUTO: 2.7 10E3/UL (ref 1.6–8.3)
NEUTROPHILS NFR BLD AUTO: 47 %
NRBC # BLD AUTO: 0 10E3/UL
NRBC BLD AUTO-RTO: 0 /100
PLATELET # BLD AUTO: 255 10E3/UL (ref 150–450)
POTASSIUM BLD-SCNC: 3.9 MMOL/L (ref 3.4–5.3)
RBC # BLD AUTO: 4.56 10E6/UL (ref 4.4–5.9)
SODIUM SERPL-SCNC: 138 MMOL/L (ref 133–144)
WBC # BLD AUTO: 5.6 10E3/UL (ref 4–11)

## 2022-02-03 PROCEDURE — 85025 COMPLETE CBC W/AUTO DIFF WBC: CPT | Performed by: STUDENT IN AN ORGANIZED HEALTH CARE EDUCATION/TRAINING PROGRAM

## 2022-02-03 PROCEDURE — 80048 BASIC METABOLIC PNL TOTAL CA: CPT | Performed by: STUDENT IN AN ORGANIZED HEALTH CARE EDUCATION/TRAINING PROGRAM

## 2022-02-03 PROCEDURE — 250N000013 HC RX MED GY IP 250 OP 250 PS 637: Performed by: STUDENT IN AN ORGANIZED HEALTH CARE EDUCATION/TRAINING PROGRAM

## 2022-02-03 PROCEDURE — 120N000002 HC R&B MED SURG/OB UMMC

## 2022-02-03 PROCEDURE — 99223 1ST HOSP IP/OBS HIGH 75: CPT | Performed by: INTERNAL MEDICINE

## 2022-02-03 PROCEDURE — 36415 COLL VENOUS BLD VENIPUNCTURE: CPT | Performed by: STUDENT IN AN ORGANIZED HEALTH CARE EDUCATION/TRAINING PROGRAM

## 2022-02-03 PROCEDURE — 99233 SBSQ HOSP IP/OBS HIGH 50: CPT | Mod: GC | Performed by: INTERNAL MEDICINE

## 2022-02-03 PROCEDURE — 99232 SBSQ HOSP IP/OBS MODERATE 35: CPT | Performed by: STUDENT IN AN ORGANIZED HEALTH CARE EDUCATION/TRAINING PROGRAM

## 2022-02-03 PROCEDURE — 250N000013 HC RX MED GY IP 250 OP 250 PS 637: Performed by: PEDIATRICS

## 2022-02-03 PROCEDURE — 250N000011 HC RX IP 250 OP 636: Performed by: STUDENT IN AN ORGANIZED HEALTH CARE EDUCATION/TRAINING PROGRAM

## 2022-02-03 PROCEDURE — 99207 PR CDG-MDM COMPONENT: MEETS MODERATE - DOWN CODED: CPT | Performed by: STUDENT IN AN ORGANIZED HEALTH CARE EDUCATION/TRAINING PROGRAM

## 2022-02-03 RX ORDER — AMPICILLIN AND SULBACTAM 2; 1 G/1; G/1
3 INJECTION, POWDER, FOR SOLUTION INTRAMUSCULAR; INTRAVENOUS EVERY 6 HOURS
Status: DISCONTINUED | OUTPATIENT
Start: 2022-02-04 | End: 2022-02-04 | Stop reason: HOSPADM

## 2022-02-03 RX ORDER — NALOXONE HYDROCHLORIDE 0.4 MG/ML
0.4 INJECTION, SOLUTION INTRAMUSCULAR; INTRAVENOUS; SUBCUTANEOUS
Status: DISCONTINUED | OUTPATIENT
Start: 2022-02-03 | End: 2022-02-04 | Stop reason: HOSPADM

## 2022-02-03 RX ORDER — NALOXONE HYDROCHLORIDE 0.4 MG/ML
0.2 INJECTION, SOLUTION INTRAMUSCULAR; INTRAVENOUS; SUBCUTANEOUS
Status: DISCONTINUED | OUTPATIENT
Start: 2022-02-03 | End: 2022-02-04 | Stop reason: HOSPADM

## 2022-02-03 RX ORDER — GABAPENTIN 100 MG/1
100 CAPSULE ORAL 3 TIMES DAILY
Status: DISCONTINUED | OUTPATIENT
Start: 2022-02-03 | End: 2022-02-04

## 2022-02-03 RX ORDER — IBUPROFEN 200 MG
600 TABLET ORAL EVERY 6 HOURS PRN
Status: DISCONTINUED | OUTPATIENT
Start: 2022-02-03 | End: 2022-02-04 | Stop reason: HOSPADM

## 2022-02-03 RX ORDER — OXYCODONE HYDROCHLORIDE 5 MG/1
5 TABLET ORAL EVERY 6 HOURS PRN
Status: DISCONTINUED | OUTPATIENT
Start: 2022-02-03 | End: 2022-02-04 | Stop reason: HOSPADM

## 2022-02-03 RX ADMIN — AMPICILLIN SODIUM AND SULBACTAM SODIUM 3 G: 2; 1 INJECTION, POWDER, FOR SOLUTION INTRAMUSCULAR; INTRAVENOUS at 14:43

## 2022-02-03 RX ADMIN — OXYCODONE HYDROCHLORIDE 5 MG: 5 TABLET ORAL at 17:38

## 2022-02-03 RX ADMIN — ACETAMINOPHEN 975 MG: 325 TABLET, FILM COATED ORAL at 08:55

## 2022-02-03 RX ADMIN — IBUPROFEN 200 MG: 200 TABLET, FILM COATED ORAL at 13:54

## 2022-02-03 RX ADMIN — AMPICILLIN SODIUM AND SULBACTAM SODIUM 3 G: 2; 1 INJECTION, POWDER, FOR SOLUTION INTRAMUSCULAR; INTRAVENOUS at 20:57

## 2022-02-03 RX ADMIN — CLINDAMYCIN PHOSPHATE: 10 LOTION TOPICAL at 19:31

## 2022-02-03 RX ADMIN — GABAPENTIN 100 MG: 100 CAPSULE ORAL at 13:54

## 2022-02-03 RX ADMIN — CLINDAMYCIN PHOSPHATE: 10 LOTION TOPICAL at 08:51

## 2022-02-03 RX ADMIN — GABAPENTIN 100 MG: 100 CAPSULE ORAL at 19:27

## 2022-02-03 RX ADMIN — AMPICILLIN SODIUM AND SULBACTAM SODIUM 3 G: 2; 1 INJECTION, POWDER, FOR SOLUTION INTRAMUSCULAR; INTRAVENOUS at 08:45

## 2022-02-03 RX ADMIN — ACETAMINOPHEN 975 MG: 325 TABLET, FILM COATED ORAL at 17:38

## 2022-02-03 RX ADMIN — LIDOCAINE 1 PATCH: 246 PATCH TOPICAL at 19:27

## 2022-02-03 RX ADMIN — AMPICILLIN SODIUM AND SULBACTAM SODIUM 3 G: 2; 1 INJECTION, POWDER, FOR SOLUTION INTRAMUSCULAR; INTRAVENOUS at 03:50

## 2022-02-03 RX ADMIN — BENZOYL PEROXIDE: 50 GEL TOPICAL at 21:02

## 2022-02-03 RX ADMIN — IBUPROFEN 600 MG: 200 TABLET, FILM COATED ORAL at 20:58

## 2022-02-03 ASSESSMENT — ACTIVITIES OF DAILY LIVING (ADL)
ADLS_ACUITY_SCORE: 5

## 2022-02-03 NOTE — PLAN OF CARE
Admission dx:  Pulmonary nodules [R91.8]  Hip pain, right [M25.551]  Chest pain, unspecified type [R07.9]    [id number]  Vitals: VSS. Afebrile.   Pain/PRN: Right hip pain-new lido patch applied as previous patch removed prior to shower. Declined PO interventions.   Respiratory: Stable RA. Occasional dry cough. Clear LS.   Neuros: A&O x4. Wife at bedside as . Calm, cooperative, pleasant. Denies numbness, tingling, hearing or vision changes. PERRLA. Airborne precautions.   GI/: Voiding spontaneously. Regular diet-fair appetite. Wife brings food from home. Last BM 2/2.   Cardiac: WNL.   Skin/Wounds: CDI. Folliculitis chest.   Lines: Left PIV.   Infusions: ampicillin.   Activity: Independent, up ad parker.     Plan: ID consult. Continue IV abx. AFB cultures pending.

## 2022-02-03 NOTE — PROGRESS NOTES
[facility]    Progress Note - Hospitalist Service, GOLD TEAM 11       Date of Admission:  1/31/2022    Assessment & Plan     Jessica Farnsworth is a 32 year old male w h/o latent TB (tx 2017) and h/o H pylori (2018) who was admitted for left upper chest pain on 1/31/2022 with nodule and hilar adenopathy noted on CT c/f infection.     Changes today:  -ID consult  -Interventional pulmonology consult for potential bronchoscopy and EBUS  -Stop date of unasyn with low suspicion for bacterial pneumonia  -Start gabapentin for sciatica  -Consult PT for exercises for scitatica  -Await AFB samples for active TB    Left upper lobe pulmonary nodule  Left hilar lymphadenopathy  Recent confirmed COVID-19 infection (1/20)   History of latent tuberculosis, reportedly treated (2017)  Pt w known h/o latent TB tx in 2017, immigrant from [place]. Presented to ED with upper left chest pain, intermittently pleuritic and radiating to the back. Imaging s/f new L hilar LAD with a LISSET solid pulmonary nodule that appears tree-in-bud like.   - Strep pneumo, legionella, histo, blasto, aspergillus, fungitell, sputum, RPR, HIV - negative ve  - SELINA (+) - 1:160,    -Completed azithromycin on 2/1  -Ddx: infectious (TB vs bacterial vs viral in setting of COVID + 1/20/22) vs sarcoidosis vs malignancy vs lymphoma.   Plan:   - Pulmonary consulted, appreciate assistance  -Infectious Disease consulted   - Airborne precautions for TB rule out  - Stop unasyn on 2/4 to complete empiric treatment    Folliculitis  Pt with papular rash on chest. Derm consulted, rash c/w folliculitis. Topicals prescribed per their recommendations (see note 1/31 for full details). Subjective improvement with topicals   - Continue topical treatment (clindamycin and benzyl peroxide      RecentCOVID-19 infection    Recovered COVID      Symptom Onset unknown    Date of 1st Positive Test 1/20/2022   Vaccination Status Partially  Vaccinated       - COVID-19 special precautions, continuous pulse-ox  - Oxygen: continue current support with RA; titrate to keep SpO2 between 90-96%  - Labs: Clinically improving, will check labs as needed.   - Imaging: no additional imaging needed at this time  - Breathing treatments: no inhalers needed; avoid nebulizers in favor of MDIs   - IV fluids: not indicated at this time  - Antibiotics: not indicated   - COVID-Focused Medications: No COVID-specific therapies are appropriate at this time.  - DVT Prophylaxis: at high risk of thrombotic complications due to COVID-19 (DDimer = N/A ).          - not indicated        - consider anticoag on discharge for 30 days & until return to normal mobility     Right HIp Pain  Sciatica  Patient noted radiating right hip pain on initial exam in ED. Labs reassuring against septic joint. XR w/o fracture.   -Reports radiating, electric pain from back of buttocks into the foot.  Plan:  >Lidocaine patch  >Tylenol and ibuprofen  >Gabapentin TID  >Consult PT  >Oxycodone for break thru pain       Diet: Regular Diet Adult    DVT Prophylaxis: Ambulate every shift  Diaz Catheter: Not present  Fluids: N/A   Central Lines: None  Cardiac Monitoring: None  Code Status: Full Code      Disposition Plan   Expected Discharge: 02/04/2022   Anticipated discharge location: home with family    Delays:     Isolation  Lab Result Pending          The patient's care was discussed with the Attending Physician, Dr. Wright, Bedside Nurse, Patient and pulmonology Consultant.    Philip Wright MD  Hospitalist Service, [address]  Securely message with the Vocera Web Console (learn more here)  Text page via [employer] Paging/Directory   Please see signed in provider for up to date coverage information      Clinically Significant Risk Factors Present on Admission                ______________________________________________________________________    Interval History   Nursing and staff notes reviewed. No acute events overnight. .This morning the patient reports pain in the right hip. It starts in buttocks nad radiates down into foot. He reports sharp, electric pain that takes his breath away. 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     Data reviewed today: I reviewed all medications, new labs and imaging results over the last 24 hours.    Physical Exam   Vital Signs: Temp: 98.3  F (36.8  C) Temp src: Oral BP: 111/75 Pulse: 64   Resp: 16 SpO2: 100 % O2 Device: None (Room air)    Weight: 175 lbs 0 oz  Physical Exam  Constitutional:       General: He is not in acute distress.     Appearance: He is not ill-appearing.   Cardiovascular:      Rate and Rhythm: Normal rate and regular rhythm.      Heart sounds: Normal heart sounds. No murmur heard.      Pulmonary:      Effort: Pulmonary effort is normal.      Breath sounds: Rales present.   Abdominal:      General: Abdomen is flat. Bowel sounds are normal.      Palpations: Abdomen is soft.   Musculoskeletal:      Lumbar back: Positive right straight leg raise test.      Right lower leg: No edema.      Left lower leg: No edema.      Comments: Limited range of motion of right hip due to pain but intact range of motion if done slowly   Neurological:      Mental Status: He is alert.       Data   Recent Labs   Lab 02/02/22  0645 02/01/22  0644 01/31/22  0111   WBC 7.1 5.6 7.0   HGB 13.6 14.0 14.9   MCV 89 89 88    242 278     --  135   POTASSIUM 4.1  --  3.8   CHLORIDE 108  --  105   CO2 24  --  27   BUN 8  --  9   CR 0.65*  --  0.80   ANIONGAP 7  --  3   RADHA 9.6  --  9.4   *  --  95   ALBUMIN 3.1*  --  4.0   PROTTOTAL 7.4  --  8.8   BILITOTAL 0.5  --  0.3   ALKPHOS 60  --  76   ALT 32  --  47   AST 17  --  20

## 2022-02-03 NOTE — PLAN OF CARE
Time: 1209-1705    Vitals: /70 (BP Location: Right arm)   Pulse 69   Temp 98.3  F (36.8  C) (Oral)   Resp 16   Wt 79.4 kg (175 lb)   SpO2 97%   BMI 23.73 kg/m      Pain: R hip, L chest pain, pt does not find relief from heat or cold application, no lidocaine patch in place this shift  PRN medications: PRN tylenol given 1x, PRN ibuprofen given 1x  Activity: IND in the room  Cardiac: WDL  Respiratory: RA, LS clear  Neuro: A&Ox4, calls appropriately, makes needs known, wife at beside  GI/: regular diet, voids spontaneously, LBM 2/2  blood sugar: n/a  Skin: folliculitis on chest  Drains: n/a  Labs/Imaging: RN reviewed  Diet: regular diet  Lines: R PIV w/ ampicillin Q6 hours    Changes this Shift: continue airborne precautions, continue IV antibiotics, AFB cultures pending   PRN medications: PRN tylenol 1x, PRN ibuprofen    Plan:    Continue to monitor per POC

## 2022-02-03 NOTE — CONSULTS
[facility] ID SERVICE CONSULTATION     Patient:  Jessica Farnsworth   Date of birth 1990, Medical record number [id number]  Date of Visit:  02/03/2022  Date of Admission: 1/31/2022  Consult Requester:Philip Wright MD            Assessment and Recommendations:   ASSESSMENT:  1. Suspected pulmonary tuberculosis versus fungal infection versus non-infectious etiology of hilar adenopathy. Doubt that the large left hilar lymph nodes are due COVID-19 or a more routine bacterial pneumonia.   2. The LISSET pulmonary nodule is quite small and the lungs are not showing other significant infiltrates at this time so I suspect the sputum AFB smears will be negative but these are still pending. Cultures may return positive for AFB but these can take 2 weeks or more to become positive on culture. Therefore I would favor proceding with the bronchoscopy and BAL and EBUS guided biopsy of the left hilar lymph nodes. If the lymph nodes show granulomas consistent with M. tuberculous infection I would then start the patient on 4 drug TB therapy with RIPE while waiting for cultures.       RECOMMENDATION:  1. Would recommend proceeding with bronchoscopy, BAL and EBUS guided lymph node biopsy is pulmonary agrees. Please send BAL fluid for AFB smear and culture and fungal cultures and KOH. Also please send biopsy tissue specimens of the lymph node to the Infectious Disease Diagnostic lab for additional AFB tissue stains and cultures and fungal cultures.  2. In addition to the standard histopathology for ask pathology to add stains for AFB and fungal organisms.     Attending Physician Attestation:  I have seen and evaluated Jessica Farnsworth. . I have reviewed today's vital signs, medications, labs and imaging.     Haley Marsh MD  ID staff  Pager [phone]    ________________________________________________________________    Consult Question: suspected TB please advise on diagnosis and treatment.   Admission Diagnosis: Pulmonary  nodules [R91.8]  Hip pain, right [M25.551]  Chest pain, unspecified type [R07.9]         History of Present Illness:     Jessica Farnsworth is a 32 year old male with a history of a recent COVID-19 infection (positive 22) and a potential latent TB (treated ?) and who presented to [facility] on 2022 with complaints of worsening left upper chest pain.  On admission patient was diagnosed w/ left upper lung nodule and new left hilar lymphadenopathy seen on chest CT.  On questioning by me the patient denies that he was treated for 6 to 9 months in the past with isoniazid. He does remember taking a white pill but only took it for 20 days and he thought it was for an acne infection. He does give a history of likely exposure TB when he was very young. His uncle in [place] had TB and  from it 28 years ago. His wife was recently exposed to a friend who was hospitalized for TB on Dec. 16th.  His wife has not felt well since that time but she also developed COVID-19 recently. Patient states he lost 15 pounds recently.      Per chart review, the patient went to the [facility] ED on 22 due to left chest and shoulder pain. His EKG was normal, troponin negative, normal ProBNP (14), BMP with a glucose of 123 otherwise WNL, and his CXR showed infiltrates in both lung bases otherwise normal. At that time, he tested positive for COVID-19 [negative for Influenza A/B and RSV) and was sent home to quarantine and rest (also advised getting the COVID-19 vaccine when appropriate).      In the ED, CBC WNL, CMP WNL, lactate slightly low, CRP slightly elevated at 11, ESR elevated at 20, and a normal troponin. His blood cultures, quantiferon, and AFB culture and stain are pending. For imaging, his chest CT PE () showed no pulmonary embolus, aortic dissection, or aneurysm but new 3.7 x 2.4 cm left hilar lymphadenopathy with a 0.6 x 0.5 mm left upper lobe solid pulmonary nodule that appears tree-in-bud like; TTE done which showed no  significant valvular abnormalities were noted and no vegetation or mass identified.     Patient reports having left upper chest pain for the last few weeks,  as well as a productive cough. The chest pain has been becoming more persistent these last few days. He has had night sweats every night since COVID-19 positive test. Less cough since he was admitted and started on antibiotics.        Patient is a never smoker. Patient reports no recreational drug use.   Works as a ; does not drive animals or chemical.   Denies exposures to smoke, mold, and animals.      Hospital work up to date this admission shows positive Q Gold TB assay, negative histo and negative blastomyces antigens. + SELINA 1:160 speckled. CT noted able for new LULL nodule and left hilar lymphadenopathy.       Recent culture results include:  No results found for: CULT           Review of Systems:   CONSTITUTIONAL:  No fevers or chills + night sweats.  EYES: negative for icterus  ENT:  negative for hearing loss, tinnitus and sore throat  RESPIRATORY:  + for cough with sputum and  No dyspnea  CARDIOVASCULAR:  + for chest pain  GASTROINTESTINAL:  negative for nausea, vomiting, diarrhea and constipation  GENITOURINARY:  negative for dysuria  HEME:  No easy bruising  INTEGUMENT:  + for rash on chest  NEURO:  Negative for headache           Past Medical History:   No past medical history on file.  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        Past Surgical History:   No past surgical history on file.   No surgeries.          Family History:   No family history on file.   Father with DM  Uncle with TB         Social History:     Social History     Tobacco Use     Smoking status: Never Smoker     Smokeless tobacco: Never Used   Substance Use Topics     Alcohol use: Not Currently     History   Sexual Activity     Sexual activity: Not on file    X 1 year.  Born in [place]  Moved to [facility] age  2 years.  Moved to the [place] in 2016           Current Medications (antimicrobials listed in bold):       ampicillin-sulbactam (UNASYN) IV  3 g Intravenous Q6H     benzoyl peroxide   Topical At Bedtime     clindamycin   Topical BID     lidocaine  1 patch Transdermal Q24H     lidocaine   Transdermal Q8H     sodium chloride (PF)  3 mL Intracatheter Q8H            Allergies:     Allergies   Allergen Reactions     Olopatadine Other (See Comments)     Worsening of conjunctivitis            Physical Exam:   Vitals were reviewed  Patient Vitals for the past 24 hrs:   BP Temp Temp src Pulse Resp SpO2   02/03/22 0702 111/69 98.5  F (36.9  C) Oral 59 16 99 %   02/02/22 2211 111/75 98.3  F (36.8  C) Oral 64 16 100 %   02/02/22 1300 106/71 98.4  F (36.9  C) Oral 66 15 98 %     Ranges for his vital signs:  Temp:  [98.3  F (36.8  C)-98.5  F (36.9  C)] 98.5  F (36.9  C)  Pulse:  [59-66] 59  Resp:  [15-16] 16  BP: (106-111)/(69-75) 111/69  SpO2:  [98 %-100 %] 99 %    Physical Examination:  GENERAL:  well-developed, well-nourished, in bed in no acute distress.   HEENT:  Head is normocephalic, atraumatic   EYES:  Eyes have anicteric sclerae without conjunctival injection or stigmata of endocarditis.    ENT:  Oropharynx is moist without exudates or ulcers. Tongue is midline  NECK:  Supple. No  Cervical lymphadenopathy  LUNGS:  Clear to auscultation bilateral.   CARDIOVASCULAR:  Regular rate and rhythm with no murmurs, gallops or rubs.  ABDOMEN:  Normal bowel sounds, soft, nontender. No appreciable hepatosplenomegaly  SKIN:  Raised papule/pustule on left chest.  NEUROLOGIC:  Grossly nonfocal. Active x4 extremities         Laboratory Data:     Inflammatory Markers    Recent Labs   Lab Test 02/01/22  0644 01/31/22  1554 01/31/22  0111   SED  --  28* 20*   CRP 12.0* 13.0* 11.0*       Hematology Studies    Recent Labs   Lab Test 02/03/22  0745 02/02/22  0645 02/01/22  0644 01/31/22  0111 01/20/22  2334   WBC 5.6 7.1 5.6 7.0 7.9   HGB 13.7  13.6 14.0 14.9 13.2*   MCV 88 89 89 88 88    243 242 278 267       Immune Globulin Studies  No lab results found.    Metabolic Studies     Recent Labs   Lab Test 02/03/22  0745 02/02/22  0645 01/31/22  0111 01/20/22  2334    139 135 137   POTASSIUM 3.9 4.1 3.8 3.6   CHLORIDE 109 108 105 105   CO2 26 24 27 27   BUN 7 8 9 9   CR 0.66 0.65* 0.80 0.81   GFRESTIMATED >90 >90 >90 >90       Hepatic Studies    Recent Labs   Lab Test 02/02/22  0645 01/31/22  0111   BILITOTAL 0.5 0.3   ALKPHOS 60 76   ALBUMIN 3.1* 4.0   AST 17 20   ALT 32 47       Thyroid Studies  No lab results found.    Invalid input(s): FT2    Microbiology:  No results found for: CULT    Urine Studies  No lab results found.      Radiology:   EXAM: CT CHEST PULMONARY EMBOLISM W CONTRAST, CT ABDOMEN PELVIS W CONTRAST  LOCATION: [facility]  DATE/TIME: 1/31/2022 2:58 AM    INDICATION: PE suspected, high prob.  COMPARISON: CT chest dated 11/14/2018.  TECHNIQUE: CT chest pulmonary angiogram during arterial phase injection of IV contrast. CT abdomen and pelvis were performed during the portal venous phase. Multiplanar reformats and MIP reconstructions were performed. Dose reduction techniques were   used.   CONTRAST: 107 mL Isovue 370.     FINDINGS:  ANGIOGRAM CHEST: Pulmonary arteries are normal caliber and negative for pulmonary emboli. Thoracic aorta is negative for dissection. No CT evidence of right heart strain.    LUNGS AND PLEURA: 5.8 x 4.5 mm solid nodular density in the left upper lobe (5, 26) that appears to extend along the bronchovascular margins. No pneumothorax or pleural effusion.    MEDIASTINUM/AXILLAE: Interval development of 3.7 x 2.4 cm left hilar lymphadenopathy.    CORONARY ARTERY CALCIFICATION: None.    ABDOMEN: Normal liver, gallbladder, spleen, adrenals, and pancreas. Normal kidneys. No obstructing renal or ureteral stones. No free air or fluid. No retroperitoneal or mesenteric  lymphadenopathy.    PELVIS: Normal appendix. No obstruction, colitis, or diverticulitis. No free air or fluid. Normal bladder.    MUSCULOSKELETAL: Mild multilevel discogenic degenerative change. No joint effusions. No radiographic evidence of osteomyelitis.    IMPRESSION:  1. No pulmonary embolus, aortic dissection, or aneurysm.  2. New 3.7 x 2.4 cm left hilar lymphadenopathy with a 0.6 x 0.5 mm left upper lobe solid pulmonary nodule that appears tree-in-bud like. Diagnostic considerations include infectious, inflammatory, and malignant etiologies to include tuberculosis.   Recommend follow-up to resolution.    Findings were discussed with Dr. Hopkins at 3:46 AM on 01/31/2021.    Imaging features are atypical or uncommonly reported for (COVID-19) pneumonia. Alternative diagnoses should be considered.

## 2022-02-04 ENCOUNTER — APPOINTMENT (OUTPATIENT)
Dept: INTERPRETER SERVICES | Facility: CLINIC | Age: 32
End: 2022-02-04
Payer: COMMERCIAL

## 2022-02-04 VITALS
WEIGHT: 175 LBS | OXYGEN SATURATION: 98 % | TEMPERATURE: 98.2 F | HEART RATE: 53 BPM | BODY MASS INDEX: 23.73 KG/M2 | DIASTOLIC BLOOD PRESSURE: 57 MMHG | RESPIRATION RATE: 16 BRPM | SYSTOLIC BLOOD PRESSURE: 99 MMHG

## 2022-02-04 DIAGNOSIS — R91.8 PULMONARY NODULES: Primary | ICD-10-CM

## 2022-02-04 LAB
ANION GAP SERPL CALCULATED.3IONS-SCNC: 5 MMOL/L (ref 3–14)
BASOPHILS # BLD AUTO: 0 10E3/UL (ref 0–0.2)
BASOPHILS NFR BLD AUTO: 1 %
BUN SERPL-MCNC: 10 MG/DL (ref 7–30)
CALCIUM SERPL-MCNC: 9.4 MG/DL (ref 8.5–10.1)
CHLORIDE BLD-SCNC: 110 MMOL/L (ref 94–109)
CO2 SERPL-SCNC: 25 MMOL/L (ref 20–32)
CREAT SERPL-MCNC: 0.68 MG/DL (ref 0.66–1.25)
EOSINOPHIL # BLD AUTO: 0.3 10E3/UL (ref 0–0.7)
EOSINOPHIL NFR BLD AUTO: 5 %
ERYTHROCYTE [DISTWIDTH] IN BLOOD BY AUTOMATED COUNT: 12.6 % (ref 10–15)
GFR SERPL CREATININE-BSD FRML MDRD: >90 ML/MIN/1.73M2
GLUCOSE BLD-MCNC: 99 MG/DL (ref 70–99)
HCT VFR BLD AUTO: 40.5 % (ref 40–53)
HGB BLD-MCNC: 13.5 G/DL (ref 13.3–17.7)
IMM GRANULOCYTES # BLD: 0 10E3/UL
IMM GRANULOCYTES NFR BLD: 0 %
LYMPHOCYTES # BLD AUTO: 2.4 10E3/UL (ref 0.8–5.3)
LYMPHOCYTES NFR BLD AUTO: 43 %
MCH RBC QN AUTO: 30.1 PG (ref 26.5–33)
MCHC RBC AUTO-ENTMCNC: 33.3 G/DL (ref 31.5–36.5)
MCV RBC AUTO: 90 FL (ref 78–100)
MONOCYTES # BLD AUTO: 0.4 10E3/UL (ref 0–1.3)
MONOCYTES NFR BLD AUTO: 8 %
NEUTROPHILS # BLD AUTO: 2.4 10E3/UL (ref 1.6–8.3)
NEUTROPHILS NFR BLD AUTO: 43 %
NRBC # BLD AUTO: 0 10E3/UL
NRBC BLD AUTO-RTO: 0 /100
PLATELET # BLD AUTO: 235 10E3/UL (ref 150–450)
POTASSIUM BLD-SCNC: 4.3 MMOL/L (ref 3.4–5.3)
RBC # BLD AUTO: 4.48 10E6/UL (ref 4.4–5.9)
SODIUM SERPL-SCNC: 140 MMOL/L (ref 133–144)
WBC # BLD AUTO: 5.6 10E3/UL (ref 4–11)

## 2022-02-04 PROCEDURE — 80048 BASIC METABOLIC PNL TOTAL CA: CPT | Performed by: STUDENT IN AN ORGANIZED HEALTH CARE EDUCATION/TRAINING PROGRAM

## 2022-02-04 PROCEDURE — 250N000011 HC RX IP 250 OP 636: Performed by: STUDENT IN AN ORGANIZED HEALTH CARE EDUCATION/TRAINING PROGRAM

## 2022-02-04 PROCEDURE — 36415 COLL VENOUS BLD VENIPUNCTURE: CPT | Performed by: STUDENT IN AN ORGANIZED HEALTH CARE EDUCATION/TRAINING PROGRAM

## 2022-02-04 PROCEDURE — 99239 HOSP IP/OBS DSCHRG MGMT >30: CPT | Performed by: STUDENT IN AN ORGANIZED HEALTH CARE EDUCATION/TRAINING PROGRAM

## 2022-02-04 PROCEDURE — 999N000147 HC STATISTIC PT IP EVAL DEFER

## 2022-02-04 PROCEDURE — 250N000013 HC RX MED GY IP 250 OP 250 PS 637: Performed by: STUDENT IN AN ORGANIZED HEALTH CARE EDUCATION/TRAINING PROGRAM

## 2022-02-04 PROCEDURE — 85025 COMPLETE CBC W/AUTO DIFF WBC: CPT | Performed by: STUDENT IN AN ORGANIZED HEALTH CARE EDUCATION/TRAINING PROGRAM

## 2022-02-04 RX ORDER — GABAPENTIN 100 MG/1
200 CAPSULE ORAL 3 TIMES DAILY
Status: DISCONTINUED | OUTPATIENT
Start: 2022-02-04 | End: 2022-02-04 | Stop reason: HOSPADM

## 2022-02-04 RX ORDER — GABAPENTIN 100 MG/1
200 CAPSULE ORAL 3 TIMES DAILY
Qty: 90 CAPSULE | Refills: 0 | Status: SHIPPED | OUTPATIENT
Start: 2022-02-04 | End: 2024-03-04

## 2022-02-04 RX ORDER — ACETAMINOPHEN 325 MG/1
325 TABLET ORAL EVERY 4 HOURS PRN
Refills: 0 | COMMUNITY
Start: 2022-02-04

## 2022-02-04 RX ADMIN — IBUPROFEN 600 MG: 200 TABLET, FILM COATED ORAL at 08:49

## 2022-02-04 RX ADMIN — AMPICILLIN SODIUM AND SULBACTAM SODIUM 3 G: 2; 1 INJECTION, POWDER, FOR SOLUTION INTRAMUSCULAR; INTRAVENOUS at 08:50

## 2022-02-04 RX ADMIN — GABAPENTIN 100 MG: 100 CAPSULE ORAL at 08:49

## 2022-02-04 RX ADMIN — CLINDAMYCIN PHOSPHATE: 10 LOTION TOPICAL at 08:51

## 2022-02-04 RX ADMIN — AMPICILLIN SODIUM AND SULBACTAM SODIUM 3 G: 2; 1 INJECTION, POWDER, FOR SOLUTION INTRAMUSCULAR; INTRAVENOUS at 03:53

## 2022-02-04 ASSESSMENT — ACTIVITIES OF DAILY LIVING (ADL)
ADLS_ACUITY_SCORE: 5

## 2022-02-04 NOTE — CONSULTS
INTERVENTIONAL PULMONOLOGY       Jessica Farnsworth   MRN# [id number]   Age: 32 year old YOB: 1990     Date of Admission:  1/31/2022  Reason for Consultation:     Lymph node biopsy   Requesting Physician:           MAYDA Ochoa MD        Assessment and Plan:  32 year old male with recent COVID infection along with recent potential exposure to tuberculosis presenting with chest pain , in addition noted to have subacute symptoms of cough, night sweats and weight loss . Found to have sub centimeter LISSET lung nodule and associated left hilar lymphadenopathy . Etiology of hilar lymph node less likely to be malignant related . His active TB workup is still pending though 2/3 AFB stains have resulted negative . ID following recommends bronchoscopy with BAL and EBUS-FNA of lymph node given length of wait time for TB cultures and potential false negative given small size of lung nodule in regards to active TB . Given recent COVID exposure would favor repeat CT in 4-6 weeks to assess clearance , however, if patient remains in hospital may potentially proceed with bronchoscopy next week .     - recommend 4-6 week CT chest non contrast and IP follow up if patient is discharged prior to next week   - tentatively may perform bronchoscopy with BAL and EBUS FNA next week     IP will continue to follow                       HPI/Interval history     Jessica Farnsworth is a 32 year old male with recent covid infections in 1/20/22 admitted on 1/31/22 with complaints of worsening left upper chest pain .     Also carries a history of latent TB , which was per interview not treated . His wife has had recent exposure to friend with active TB . In addition has history of likely exposure to TB in youth .     Noted to have on CT imaging on this admission left upper lung nodule (0.6 cm x 0.5 cm) and left hilar lymphadenopathy (3.7 cm x 2.4 cm)     Patient has additionally complained of significant weight loss and night sweats and  cough over the last few weeks .     2/3 sputum samples negative for AFB stain , third is pending . quantiferon from 1/31/22 is positive.          Past Medical History:    No past medical history on file.        Past Surgical History:    No past surgical history on file.       Social History:     Social History     Tobacco Use     Smoking status: Never Smoker     Smokeless tobacco: Never Used   Substance Use Topics     Alcohol use: Not Currently     Patient is a non smoker          Family History:   No family history on file.     Family history negative for lung cancer.          Allergies:      Allergies   Allergen Reactions     Olopatadine Other (See Comments)     Worsening of conjunctivitis          Medications:     Current Facility-Administered Medications   Medication     acetaminophen (TYLENOL) tablet 975 mg     [START ON 2/4/2022] ampicillin-sulbactam (UNASYN) 3 g vial to attach to  mL bag     benzoyl peroxide 5 % topical gel     clindamycin (CLEOCIN T) 1 % lotion     gabapentin (NEURONTIN) capsule 100 mg     ibuprofen (ADVIL/MOTRIN) tablet 600 mg     Lidocaine (LIDOCARE) 4 % Patch 1 patch     lidocaine (LMX4) cream     lidocaine 1 % 0.1-1 mL     lidocaine patch in PLACE     melatonin tablet 1 mg     naloxone (NARCAN) injection 0.2 mg    Or     naloxone (NARCAN) injection 0.4 mg    Or     naloxone (NARCAN) injection 0.2 mg    Or     naloxone (NARCAN) injection 0.4 mg     ondansetron (ZOFRAN-ODT) ODT tab 4 mg    Or     ondansetron (ZOFRAN) injection 4 mg     oxyCODONE (ROXICODONE) tablet 5 mg     polyethylene glycol (MIRALAX) Packet 17 g     prochlorperazine (COMPAZINE) injection 10 mg    Or     prochlorperazine (COMPAZINE) tablet 10 mg    Or     prochlorperazine (COMPAZINE) suppository 25 mg     sodium chloride (PF) 0.9% PF flush 3 mL     sodium chloride (PF) 0.9% PF flush 3 mL          Review of Systems:     Rest of 12 point ROS is negative asides from that mentioned in HPI          Physical Exam:      Temp:  [98.1  F (36.7  C)-98.5  F (36.9  C)] 98.1  F (36.7  C)  Pulse:  [59-69] 61  Resp:  [16] 16  BP: (110-114)/(69-82) 114/82  SpO2:  [97 %-100 %] 100 %  Wt Readings from Last 4 Encounters:   01/30/22 79.4 kg (175 lb)   01/20/22 79.4 kg (175 lb)     Constitutional:   Awake, alert and in no apparent distress     Eyes:   Nonicteric, LIZA     ENT:    Trachea is midline. No gross neck abnormalities      Neck:   Supple without supraclavicular or cervical lymphadenopathy     Lungs:   Good air flow.  No crackles. No rhonchi.  No wheezes.     Cardiovascular:   Normal S1 and S2.  RRR.  No murmur, gallop or rub.  Radial, DP and PT pulses normal and symmetric     Abdomen:   NABS, soft, nontender, nondistended.  No HSM.     Musculoskeletal:   No edema.      Neurologic:   Alert and conversant. Cranial nerves  intact.       Skin:   Warm, dry.  No rash on limited exam.           Current Laboratory Data:   All laboratory and imaging data reviewed.    Results for orders placed or performed during the hospital encounter of 01/31/22 (from the past 24 hour(s))   CBC with Platelets & Differential    Narrative    The following orders were created for panel order CBC with Platelets & Differential.  Procedure                               Abnormality         Status                     ---------                               -----------         ------                     CBC with platelets and d...[[id number]]                      Final result                 Please view results for these tests on the individual orders.   Basic metabolic panel   Result Value Ref Range    Sodium 138 133 - 144 mmol/L    Potassium 3.9 3.4 - 5.3 mmol/L    Chloride 109 94 - 109 mmol/L    Carbon Dioxide (CO2) 26 20 - 32 mmol/L    Anion Gap 3 3 - 14 mmol/L    Urea Nitrogen 7 7 - 30 mg/dL    Creatinine 0.66 0.66 - 1.25 mg/dL    Calcium 9.3 8.5 - 10.1 mg/dL    Glucose 99 70 - 99 mg/dL    GFR Estimate >90 >60 mL/min/1.73m2   CBC with platelets and differential    Result Value Ref Range    WBC Count 5.6 4.0 - 11.0 10e3/uL    RBC Count 4.56 4.40 - 5.90 10e6/uL    Hemoglobin 13.7 13.3 - 17.7 g/dL    Hematocrit 40.2 40.0 - 53.0 %    MCV 88 78 - 100 fL    MCH 30.0 26.5 - 33.0 pg    MCHC 34.1 31.5 - 36.5 g/dL    RDW 12.6 10.0 - 15.0 %    Platelet Count 255 150 - 450 10e3/uL    % Neutrophils 47 %    % Lymphocytes 40 %    % Monocytes 8 %    % Eosinophils 4 %    % Basophils 1 %    % Immature Granulocytes 0 %    NRBCs per 100 WBC 0 <1 /100    Absolute Neutrophils 2.7 1.6 - 8.3 10e3/uL    Absolute Lymphocytes 2.2 0.8 - 5.3 10e3/uL    Absolute Monocytes 0.4 0.0 - 1.3 10e3/uL    Absolute Eosinophils 0.2 0.0 - 0.7 10e3/uL    Absolute Basophils 0.0 0.0 - 0.2 10e3/uL    Absolute Immature Granulocytes 0.0 <=0.4 10e3/uL    Absolute NRBCs 0.0 10e3/uL     Radiographic imaging reviewed - left upper lobe nodule and left hilar lymphadenopathy

## 2022-02-04 NOTE — PLAN OF CARE
Pt discharged from  at 1445 per medicine team discharge order. Pt left with wife and will return home. R PIV removed prior to discharge. Pt left with belongings including medications that were picked up at discharge pharmacy. Pt understands follow up appointment next week with repeat scans. Pt and wife did not have any questions and were eager to go home.

## 2022-02-04 NOTE — PLAN OF CARE
5A: cancel/defer    Physical Therapy: Orders received. Chart reviewed and discussed with care team.? Physical Therapy not indicated due to current mobility status. Per chart and discussion with RN, pt is mobilizing IND in room and steady on feet. Chart and PT order indicate pt has sciatic pain. Given current mobility status pt needs would be best served in OP PT following discharge from hospital.? Defer discharge recommendations to OP PT.? Will complete orders.

## 2022-02-04 NOTE — PLAN OF CARE
Pt is alert and oriented. Up ad parker in room. C/o R buttock and leg pain that radiates down leg. Pain improved from 7/10 to 2/10 after tylenol, ibuprofen, oxy, gabapentin and lidocaine patch which appeared to be manageable. Tolerating food from home. Lungs clear, denies cough, maintains sats on room air. Voids without issue and reports BM 2/2.

## 2022-02-04 NOTE — PLAN OF CARE
Shift [id number]: Slept well overnight. Stable on RA with no cough noted. LS clear throughout. R PIV w/ IV abx q6h. AOx4, able to make needs known. Voiding adequately. LBM 2/2. AFBs pending. Pulm and ID to consult and follow to develop plan for pt moving forward.

## 2022-02-05 LAB
BACTERIA BLD CULT: NO GROWTH

## 2022-02-05 NOTE — DISCHARGE SUMMARY
[facility]  Hospitalist Discharge Summary      Date of Admission:  1/31/2022  Date of Discharge:  2/4/2022  2:57 PM  Discharging Provider: Philip Wright MD  Discharge Service: Hospitalist Service, GOLD TEAM 11    Discharge Diagnoses   Hilar Adenopathy  Latent TB  Pulmonary Nodule    Follow-ups Needed After Discharge   Follow-up Appointments     Follow Up ([facility])      Follow up with Interventional Pulmonary , at (location with clinic name   or city) , within 1 week  for hospital follow- up and . The following   labs/tests are recommended: Repeat CT chest and EBUS/bronchoscopy    Follow up with Infectious Disease after bronchoscopy .    Appointments on [place] and/or [facility] (with [facility] or [facility]   provider or service). Call [phone] if you haven't heard regarding   these appointments within 7 days of discharge.             Unresulted Labs Ordered in the Past 30 Days of this Admission     Date and Time Order Name Status Description    2/2/2022  1:01 AM Acid-Fast Bacilli Culture and Stain In process     2/1/2022  3:03 PM Acid-Fast Bacilli Culture and Stain In process     1/31/2022  3:06 PM Blood Culture Hand, Left Preliminary     1/31/2022  3:06 PM Blood Culture Arm, Right Preliminary     1/31/2022  2:56 PM Fungal or Yeast Culture Routine Preliminary     1/31/2022  4:44 AM Acid-Fast Bacilli Culture and Stain In process     1/31/2022 12:52 AM Blood Culture Peripheral Blood Preliminary     1/31/2022 12:52 AM Blood Culture Peripheral Blood Preliminary       These results will be followed up by Hospitalist, ID    Discharge Disposition   Discharged to home  Condition at discharge: Stable      Hospital Course   Jessica Farnsworth is a 32 year old male w h/o latent TB (tx 2017) and h/o H pylori (2018) who was admitted for left upper chest pain on 1/31/2022 with new pulmonary nodule and hilar adenopathy noted on CT chest. There was concern for active  tuberculosis. Pulmonary was consulted as well as infectious disease. AFB smears x 3 were initially negative and plans were made for outpatient follow up CT chest and EBUS/bronchoscopy after discharge.    Hospital course complicated by sciatica treated with nsaids, tylenol, gabapentin and referral to outpatient PT.        Consultations This Hospital Stay   PULMONARY GENERAL ADULT IP CONSULT  DERMATOLOGY IP CONSULT  VASCULAR ACCESS CARE ADULT IP CONSULT  INFECTIOUS DISEASE GENERAL ADULT IP CONSULT  PHYSICAL THERAPY ADULT IP CONSULT  INTERVENTIONAL PULMONARY ADULT IP CONSULT    Code Status   Prior    Time Spent on this Encounter   I, Philip Wright MD, personally saw the patient today and spent greater than 30 minutes discharging this patient.       Philip Wright MD  [facility] [address]  [address] [facility] [address]  Phone: [phone]  ______________________________________________________________________    Physical Exam   Vital Signs: Temp: 98.2  F (36.8  C) Temp src: Oral BP: 99/57 Pulse: 53   Resp: 16 SpO2: 98 % O2 Device: None (Room air)    Weight: 175 lbs 0 oz  Constitutional: alert, oriented, no acute distress  Eyes: no icterus  ENT: no JVD  Respiratory: CTAB, no wheezing  Cardiovascular: normal s1, s2, RRR  GI: soft, non-tender, non-distended, bowel sounds present  Neurologic: moving all extremities, holding eye contact       Primary Care Physician   Physician No Ref-Primary    Discharge Orders      Infectious Disease Referral      Physical Therapy Referral      Pulmonary Medicine Referral      Reason for your hospital stay    Enlarged lymph node--possible tuberculosis.     Activity    Your activity upon discharge: activity as tolerated     Follow Up ([facility])    Follow up with Interventional Pulmonary , at (location with clinic name or city) , within 1 week  for hospital follow- up and . The following labs/tests are recommended: Repeat CT chest and  EBUS/bronchoscopy    Follow up with Infectious Disease after bronchoscopy .    Appointments on [place] and/or [facility] (with [facility] or [facility] provider or service). Call [phone] if you haven't heard regarding these appointments within 7 days of discharge.     Diet    Follow this diet upon discharge: Orders Placed This Encounter      Regular Diet Adult       Significant Results and Procedures   Most Recent 3 CBC's:Recent Labs   Lab Test 02/04/22  0705 02/03/22  0745 02/02/22  0645   WBC 5.6 5.6 7.1   HGB 13.5 13.7 13.6   MCV 90 88 89    255 243     Most Recent 3 BMP's:Recent Labs   Lab Test 02/04/22  0705 02/03/22  0745 02/02/22  0645    138 139   POTASSIUM 4.3 3.9 4.1   CHLORIDE 110* 109 108   CO2 25 26 24   BUN 10 7 8   CR 0.68 0.66 0.65*   ANIONGAP 5 3 7   RADHA 9.4 9.3 9.6   GLC 99 99 108*     Most Recent 2 LFT's:Recent Labs   Lab Test 02/02/22  0645 01/31/22  0111   AST 17 20   ALT 32 47   ALKPHOS 60 76   BILITOTAL 0.5 0.3       Discharge Medications   Discharge Medication List as of 2/4/2022  1:56 PM      START taking these medications    Details   acetaminophen (TYLENOL) 325 MG tablet Take 1 tablet (325 mg) by mouth every 4 hours as needed for mild pain or fever, R-0, OTC      gabapentin (NEURONTIN) 100 MG capsule Take 2 capsules (200 mg) by mouth 3 times daily, Disp-90 capsule, R-0, E-Prescribe         CONTINUE these medications which have NOT CHANGED    Details   ibuprofen (ADVIL/MOTRIN) 600 MG tablet Take 1 tablet (600 mg) by mouth every 8 hours as needed for moderate pain, Disp-20 tablet, R-0, Local Print         STOP taking these medications       UNKNOWN TO PATIENT Comments:   Reason for Stopping:             Allergies   Allergies   Allergen Reactions     Olopatadine Other (See Comments)     Worsening of conjunctivitis

## 2022-02-07 ENCOUNTER — TELEPHONE (OUTPATIENT)
Dept: INFECTIOUS DISEASES | Facility: CLINIC | Age: 32
End: 2022-02-07
Payer: COMMERCIAL

## 2022-02-07 NOTE — TELEPHONE ENCOUNTER
[facility] Call Center    Phone Message    May a detailed message be left on voicemail: yes     Reason for Call: Other: .      Per Patients Wife, Inez is returning a call. Inez states patient has a referral for Pulmonary nodules by YIFAN Mascorro. Per the guidelines the diagnosis is not on the list to schedule.  Please advise.     Action Taken: Message routed to:  [facility] ID    Travel Screening: Not Applicable

## 2022-02-07 NOTE — TELEPHONE ENCOUNTER
Patient scheduled for 2/22 with Dr. Marsh. Called Inez with appointment information, no answer- Left voicemail.

## 2022-02-08 ENCOUNTER — OFFICE VISIT (OUTPATIENT)
Dept: FAMILY MEDICINE | Facility: CLINIC | Age: 32
End: 2022-02-08
Payer: COMMERCIAL

## 2022-02-08 VITALS
BODY MASS INDEX: 23.84 KG/M2 | DIASTOLIC BLOOD PRESSURE: 73 MMHG | HEIGHT: 72 IN | TEMPERATURE: 96.5 F | WEIGHT: 176 LBS | SYSTOLIC BLOOD PRESSURE: 112 MMHG | OXYGEN SATURATION: 99 % | HEART RATE: 68 BPM

## 2022-02-08 DIAGNOSIS — R07.81 PLEURITIC CHEST PAIN: ICD-10-CM

## 2022-02-08 DIAGNOSIS — Z09 HOSPITAL DISCHARGE FOLLOW-UP: Primary | ICD-10-CM

## 2022-02-08 DIAGNOSIS — R91.8 PULMONARY NODULES: ICD-10-CM

## 2022-02-08 PROCEDURE — 99495 TRANSJ CARE MGMT MOD F2F 14D: CPT | Performed by: FAMILY MEDICINE

## 2022-02-08 RX ORDER — NAPROXEN 500 MG/1
500 TABLET ORAL 2 TIMES DAILY WITH MEALS
Qty: 30 TABLET | Refills: 0 | Status: SHIPPED | OUTPATIENT
Start: 2022-02-08 | End: 2024-03-04

## 2022-02-08 ASSESSMENT — MIFFLIN-ST. JEOR: SCORE: 1791.33

## 2022-02-08 NOTE — PROGRESS NOTES
Assessment & Plan     Hospital discharge follow-up  Planning  Follow up with Interventional Pulmonary , at (location with clinic name   or city) , within 1 week  for hospital follow- up and . The following   labs/tests are recommended: Repeat CT chest and EBUS/bronchoscopy     Follow up with Infectious Disease after bronchoscopy .     Appointments on [place] and/or [facility] (with [facility] or [facility]   provider or service). Call [phone] if you haven't heard regarding   these appointments within 7 days of discharge.    Pulmonary nodules  As above   reviewed CTs at [facility]  - Adult Pulmonary Medicine Referral    Pleuritic chest pain  intermittent   try  - naproxen (NAPROSYN) 500 MG tablet; Take 1 tablet (500 mg) by mouth 2 times daily (with meals)    Review of external notes as documented elsewhere in note  Review of the result(s) of each unique test - CT scans  15 minutes spent on the date of the encounter doing review of test results and interpretation of tests        See Patient Instructions    Return in about 3 days (around 2/11/2022) for Follow up.    Francesco Fontana MD  [facility] Lopez is a 32 year old who presents for the following health issues     HPI       Hospital Follow-up Visit:    Hospital/Nursing Home/IP Rehab Facility: [facility]  Date of Admission: 1/31  Date of Discharge: 2/4  Reason(s) for Admission: chest/ pulm mass      Was your hospitalization related to COVID-19? No   Problems taking medications regularly:  None  Medication changes since discharge: None  Problems adhering to non-medication therapy:  None    Summary of hospitalization:  [facility] hospital discharge summary reviewed  Diagnostic Tests/Treatments reviewed.  Follow up needed: Pulm/ ID  Other Healthcare Providers Involved in Patient s Care:         Specialist appointment - as above   Follow up with Interventional Pulmonary ,  at (location with clinic name   or city) , within 1 week  for hospital follow- up and . The following   labs/tests are recommended: Repeat CT chest and EBUS/bronchoscopy     Follow up with Infectious Disease after bronchoscopy .     Appointments on [place] and/or [facility] (with [facility] or [facility]   provider or service). Call [phone] if you haven't heard regarding   these appointments within 7 days of discharge.  Update since discharge: fluctuating course. Post Discharge Medication Reconciliation: discharge medications reconciled, continue medications without change.  Plan of care communicated with patient and family              Review of Systems   Constitutional, HEENT, cardiovascular, pulmonary, gi and gu systems are negative, except as otherwise noted.      Objective    /73 (BP Location: Left arm, Patient Position: Sitting, Cuff Size: Adult Regular)   Pulse 68   Temp (!) 96.5  F (35.8  C) (Temporal)   Ht 1.829 m (6')   Wt 79.8 kg (176 lb)   SpO2 99%   BMI 23.87 kg/m    Body mass index is 23.87 kg/m .  Physical Exam   GENERAL: alert, mild distress and fatigued  NECK: no adenopathy, no asymmetry, masses, or scars and thyroid normal to palpation  RESP: lungs clear to auscultation - no rales, rhonchi or wheezes  CV: regular rate and rhythm, normal S1 S2, no S3 or S4, no murmur, click or rub, no peripheral edema and peripheral pulses strong  ABDOMEN: soft, nontender, no hepatosplenomegaly, no masses and bowel sounds normal  SKIN: no suspicious lesions or rashes  PSYCH: mentation appears normal, anxious and fatigued    No results displayed because visit has over 200 results.

## 2022-02-11 ENCOUNTER — ANCILLARY PROCEDURE (OUTPATIENT)
Dept: CT IMAGING | Facility: CLINIC | Age: 32
End: 2022-02-11
Attending: INTERNAL MEDICINE
Payer: COMMERCIAL

## 2022-02-11 DIAGNOSIS — R91.8 PULMONARY NODULES: ICD-10-CM

## 2022-02-11 PROCEDURE — 71250 CT THORAX DX C-: CPT | Mod: GC | Performed by: RADIOLOGY

## 2022-02-21 ENCOUNTER — VIRTUAL VISIT (OUTPATIENT)
Dept: FAMILY MEDICINE | Facility: CLINIC | Age: 32
End: 2022-02-21
Payer: COMMERCIAL

## 2022-02-21 DIAGNOSIS — M25.551 HIP PAIN, RIGHT: ICD-10-CM

## 2022-02-21 DIAGNOSIS — A31.0 MYCOBACTERIUM AVIUM INFECTION (H): Primary | ICD-10-CM

## 2022-02-21 PROCEDURE — 99215 OFFICE O/P EST HI 40 MIN: CPT | Mod: 95 | Performed by: FAMILY MEDICINE

## 2022-02-22 ENCOUNTER — TELEPHONE (OUTPATIENT)
Dept: INFECTIOUS DISEASES | Facility: CLINIC | Age: 32
End: 2022-02-22
Payer: COMMERCIAL

## 2022-02-22 NOTE — TELEPHONE ENCOUNTER
Called patient using  #[phone]- patient did not answer, left VM via  regarding appointment 3/1 at 9am. Left clinic call back #.

## 2022-02-23 NOTE — TELEPHONE ENCOUNTER
RECORDS RECEIVED FROM: Internal   DATE RECEIVED: 3.1.22   NOTES (Gather within 2 years) STATUS DETAILS   OFFICE NOTE from referring provider   Internal 2.4.22 SULY Wright [facility]   OFFICE NOTE from other specialist N/A    DISCHARGE SUMMARY from hospital N/A    DISCHARGE REPORT from the ER Internal 1.31.22 ARELIS Wright    LABS (any labs) Internal    MEDICATION LIST Internal    IMAGING  (NEED IMAGES AND REPORTS)     Osteomyelitis: Foot imaging  N/A    Liver Abscess: Abdominal imaging N/A    Other (anything related to diagnoses Internal 2.11.22 CT chest  1.31.22 CT chest

## 2022-02-24 PROBLEM — J45.909 MILD REACTIVE AIRWAYS DISEASE: Status: ACTIVE | Noted: 2019-08-05

## 2022-02-24 NOTE — PATIENT INSTRUCTIONS
ID clinic will call to reschedule appointment; call them if you don't hear    I will call Wed to confirm that you have been contacted for an appointment    Continue to take respiratory precautions at home/community    Will need to assess hip pain after treatment has been started

## 2022-02-24 NOTE — PROGRESS NOTES
Jessica is a 32 year old who is being evaluated via a billable video visit.      How would you like to obtain your AVS? MyChart  If the video visit is dropped, the invitation should be resent by: mobile  Will anyone else be joining your video visit? Interpretive services  Video Start Time: failed- converted to phone appt     Assessment & Plan     Mycobacterium avium infection (H)  Culture positive last week    Hip pain, right  persistent    Review of external notes as documented elsewhere in note  45 minutes spent on the date of the encounter doing chart review, history and exam, documentation and further activities per the note     Patient Instructions   ID clinic will call to reschedule appointment; call them if you don't hear    I will call Wed to confirm that you have been contacted for an appointment    Continue to take respiratory precautions at home/community    Will need to assess hip pain after treatment has been started      Return in about 1 day (around 2/22/2022) for phone call.    Zackary Castro MD  [facility]    Carla Lopez is a 32 year old who presents for the following health issues  accompanied by his .    HPI     Acute Illness  Acute illness concerns: Atypical chest pain  Onset/Duration: Several weeks  Symptoms:  Fever: YES  Chills/Sweats: YES  Headache (location?): no  Sinus Pressure: no  Conjunctivitis:  no  Ear Pain: no  Rhinorrhea: no  Congestion: Some  Sore Throat: no  Cough: YES-productive of clear sputum  Wheeze: YES-some  Decreased Appetite: Some  Nausea: no  Vomiting: no  Diarrhea: no  Dysuria/Freq.: no  Dysuria or Hematuria: no  Fatigue/Achiness: YES  Sick/Strep Exposure: no  Therapies tried and outcome: None    Concern -right hip pain  Onset: Several weeks.  ER x-ray negative  Description: Weightbearing and nonweightbearing  Intensity: moderate  Progression of Symptoms:  same and constant  Accompanying Signs & Symptoms: Respiratory  Previous  history of similar problem: No  Precipitating factors:        Worsened by: Walking  Alleviating factors:        Improved by: Slightly by rest  Therapies tried and outcome: Ibuprofen, minimal help    Timeline:  1/20/22 ER for atypical chest pain, Covid positive with bilateral lower lobe infiltrates, history of latent TB  1/31/22 ER for atypical chest pain, CT scan pulmonary nodules, hilar adenopathy, AFB smears negative x3  2/8/22 office appointment:  follow-up CT chest scheduled, AFB cultures negative x3, infectious disease appointment 1/22/2022 2/18/22 ER for right hip pain, x-ray and ultrasound negative, ibuprofen and oxycodone helped  Future ID appointment 2/22/22, canceled, needs to be rescheduled  Following ID appointment, needs appointment with pulmonary for EBUS/bronchoscopy    Review of Systems   Constitutional, HEENT, cardiovascular, pulmonary, gi and gu systems are negative, except as otherwise noted.      Objective           Vitals:  No vitals were obtained today due to virtual visit.    Physical Exam   GENERAL: Mild distress and fatigued  RESP: No audible wheeze, cough, or audibly increased work of breathing.    NEURO:  Mentation and speech appropriate, given none English speaking, using E[place]an .  PSYCH: Mentation appears normal, affect appropriately concerned, judgement and insight intact, normal speech.        Video-Visit Details    Type of service:  Video Visit    Video End Time:Switched to telephone    Originating Location (pt. Location): Home    Distant Location (provider location):  [facility]     Platform used for Video Visit: Unable to complete video visit

## 2022-02-26 LAB
ACID FAST STAIN (ARUP): ABNORMAL
ORGANISM (ARUP): ABNORMAL
ORGANISM (ARUP): ABNORMAL

## 2022-02-28 LAB — BACTERIA SPT CULT: ABNORMAL

## 2022-03-01 ENCOUNTER — HOSPITAL ENCOUNTER (EMERGENCY)
Facility: CLINIC | Age: 32
Discharge: HOME | End: 2022-03-01
Attending: EMERGENCY MEDICINE | Admitting: EMERGENCY MEDICINE
Payer: COMMERCIAL

## 2022-03-01 ENCOUNTER — PRE VISIT (OUTPATIENT)
Dept: INFECTIOUS DISEASES | Facility: CLINIC | Age: 32
End: 2022-03-01
Payer: COMMERCIAL

## 2022-03-01 ENCOUNTER — APPOINTMENT (OUTPATIENT)
Dept: CT IMAGING | Facility: CLINIC | Age: 32
End: 2022-03-01
Attending: EMERGENCY MEDICINE
Payer: COMMERCIAL

## 2022-03-01 VITALS
TEMPERATURE: 98.7 F | HEIGHT: 72 IN | RESPIRATION RATE: 17 BRPM | DIASTOLIC BLOOD PRESSURE: 80 MMHG | HEART RATE: 54 BPM | BODY MASS INDEX: 23.84 KG/M2 | OXYGEN SATURATION: 98 % | WEIGHT: 176 LBS | SYSTOLIC BLOOD PRESSURE: 115 MMHG

## 2022-03-01 DIAGNOSIS — R07.89 CHEST WALL PAIN: ICD-10-CM

## 2022-03-01 DIAGNOSIS — R59.1 LYMPHADENOPATHY: Primary | ICD-10-CM

## 2022-03-01 LAB
ALBUMIN SERPL-MCNC: 3.7 G/DL (ref 3.4–5)
ALP SERPL-CCNC: 74 U/L (ref 40–150)
ALT SERPL W P-5'-P-CCNC: 50 U/L (ref 0–70)
ANION GAP SERPL CALCULATED.3IONS-SCNC: 4 MMOL/L (ref 3–14)
AST SERPL W P-5'-P-CCNC: 22 U/L (ref 0–45)
BASOPHILS # BLD AUTO: 0.1 10E3/UL (ref 0–0.2)
BASOPHILS NFR BLD AUTO: 1 %
BILIRUB SERPL-MCNC: 0.5 MG/DL (ref 0.2–1.3)
BUN SERPL-MCNC: 10 MG/DL (ref 7–30)
CALCIUM SERPL-MCNC: 9.4 MG/DL (ref 8.5–10.1)
CHLORIDE BLD-SCNC: 106 MMOL/L (ref 94–109)
CO2 SERPL-SCNC: 27 MMOL/L (ref 20–32)
CREAT SERPL-MCNC: 0.69 MG/DL (ref 0.66–1.25)
EOSINOPHIL # BLD AUTO: 0.2 10E3/UL (ref 0–0.7)
EOSINOPHIL NFR BLD AUTO: 4 %
ERYTHROCYTE [DISTWIDTH] IN BLOOD BY AUTOMATED COUNT: 13 % (ref 10–15)
GFR SERPL CREATININE-BSD FRML MDRD: >90 ML/MIN/1.73M2
GLUCOSE BLD-MCNC: 63 MG/DL (ref 70–99)
HCT VFR BLD AUTO: 42.4 % (ref 40–53)
HGB BLD-MCNC: 14.2 G/DL (ref 13.3–17.7)
HOLD SPECIMEN: NORMAL
HOLD SPECIMEN: NORMAL
IMM GRANULOCYTES # BLD: 0 10E3/UL
IMM GRANULOCYTES NFR BLD: 1 %
LIPASE SERPL-CCNC: 84 U/L (ref 73–393)
LYMPHOCYTES # BLD AUTO: 2.3 10E3/UL (ref 0.8–5.3)
LYMPHOCYTES NFR BLD AUTO: 37 %
MCH RBC QN AUTO: 30.3 PG (ref 26.5–33)
MCHC RBC AUTO-ENTMCNC: 33.5 G/DL (ref 31.5–36.5)
MCV RBC AUTO: 91 FL (ref 78–100)
MONOCYTES # BLD AUTO: 0.5 10E3/UL (ref 0–1.3)
MONOCYTES NFR BLD AUTO: 7 %
NEUTROPHILS # BLD AUTO: 3.1 10E3/UL (ref 1.6–8.3)
NEUTROPHILS NFR BLD AUTO: 50 %
NRBC # BLD AUTO: 0 10E3/UL
NRBC BLD AUTO-RTO: 0 /100
PLATELET # BLD AUTO: 228 10E3/UL (ref 150–450)
POTASSIUM BLD-SCNC: 3.8 MMOL/L (ref 3.4–5.3)
PROT SERPL-MCNC: 7.7 G/DL (ref 6.8–8.8)
RBC # BLD AUTO: 4.68 10E6/UL (ref 4.4–5.9)
SODIUM SERPL-SCNC: 137 MMOL/L (ref 133–144)
TROPONIN I SERPL HS-MCNC: <3 NG/L
WBC # BLD AUTO: 6.1 10E3/UL (ref 4–11)

## 2022-03-01 PROCEDURE — 250N000013 HC RX MED GY IP 250 OP 250 PS 637: Performed by: EMERGENCY MEDICINE

## 2022-03-01 PROCEDURE — 71250 CT THORAX DX C-: CPT

## 2022-03-01 PROCEDURE — 36415 COLL VENOUS BLD VENIPUNCTURE: CPT | Performed by: EMERGENCY MEDICINE

## 2022-03-01 PROCEDURE — 85025 COMPLETE CBC W/AUTO DIFF WBC: CPT | Performed by: EMERGENCY MEDICINE

## 2022-03-01 PROCEDURE — 99284 EMERGENCY DEPT VISIT MOD MDM: CPT | Performed by: EMERGENCY MEDICINE

## 2022-03-01 PROCEDURE — 80053 COMPREHEN METABOLIC PANEL: CPT | Performed by: EMERGENCY MEDICINE

## 2022-03-01 PROCEDURE — 84484 ASSAY OF TROPONIN QUANT: CPT | Performed by: EMERGENCY MEDICINE

## 2022-03-01 PROCEDURE — 83690 ASSAY OF LIPASE: CPT | Performed by: EMERGENCY MEDICINE

## 2022-03-01 PROCEDURE — 71250 CT THORAX DX C-: CPT | Mod: 26 | Performed by: RADIOLOGY

## 2022-03-01 RX ORDER — IBUPROFEN 600 MG/1
600 TABLET, FILM COATED ORAL ONCE
Status: COMPLETED | OUTPATIENT
Start: 2022-03-01 | End: 2022-03-01

## 2022-03-01 RX ORDER — IBUPROFEN 100 MG/5ML
600 SUSPENSION, ORAL (FINAL DOSE FORM) ORAL ONCE
Status: DISCONTINUED | OUTPATIENT
Start: 2022-03-01 | End: 2022-03-01

## 2022-03-01 RX ORDER — TRAMADOL HYDROCHLORIDE 50 MG/1
50 TABLET ORAL EVERY 6 HOURS PRN
Qty: 12 TABLET | Refills: 0 | Status: SHIPPED | OUTPATIENT
Start: 2022-03-01 | End: 2024-03-04

## 2022-03-01 RX ORDER — HYDROCODONE BITARTRATE AND ACETAMINOPHEN 5; 325 MG/1; MG/1
1 TABLET ORAL ONCE
Status: COMPLETED | OUTPATIENT
Start: 2022-03-01 | End: 2022-03-01

## 2022-03-01 RX ADMIN — IBUPROFEN 600 MG: 600 TABLET ORAL at 16:03

## 2022-03-01 RX ADMIN — HYDROCODONE BITARTRATE AND ACETAMINOPHEN 1 TABLET: 5; 325 TABLET ORAL at 16:03

## 2022-03-01 ASSESSMENT — ENCOUNTER SYMPTOMS
PALPITATIONS: 0
COUGH: 1
MUSCULOSKELETAL NEGATIVE: 1
GASTROINTESTINAL NEGATIVE: 1
NEUROLOGICAL NEGATIVE: 1
DIAPHORESIS: 1
SHORTNESS OF BREATH: 0
FATIGUE: 1

## 2022-03-01 NOTE — ED PROVIDER NOTES
[place] DE[facility]MENT ([facility])  3/01/22  History     Chief Complaint   Patient presents with     Shortness of Breath     Hemoptysis     The history is provided by the patient and medical records.     Jessica Farnsworth is a 32-year-old male who is being worked up by pulmonology and infectious disease for possible TB.  He was not started on any anti-TB medications.  He actually missed an appointment today and from what I can tell, the plan was for him to get a repeat CT scan which I have ordered and also to get bronchoscopy.  I am not seeing a reason why he needs to be hospitalized.  He is not febrile or short of breath.  He did have a very small has hemoptysis last night.  He was advised to come here when he called the clinic to inform them that he had missed the appointment they told him if he was having problems to go to the ER.  ---  This part of the medical record was transcribed by Joe Mcwilliams Medical Scribe, from a dictation done by Noah Lopez MD.       Past Medical History  History reviewed. No pertinent past medical history.  History reviewed. No pertinent surgical history.  traMADol (ULTRAM) 50 MG tablet  acetaminophen (TYLENOL) 325 MG tablet  gabapentin (NEURONTIN) 100 MG capsule  ibuprofen (ADVIL/MOTRIN) 600 MG tablet  naproxen (NAPROSYN) 500 MG tablet      Allergies   Allergen Reactions     Olopatadine Other (See Comments)     Worsening of conjunctivitis     Family History  History reviewed. No pertinent family history.  Social History   Social History     Tobacco Use     Smoking status: Never Smoker     Smokeless tobacco: Never Used   Substance Use Topics     Alcohol use: Not Currently     Drug use: Not Currently      Past medical history, past surgical history, medications, allergies, family history, and social history were reviewed with the patient. No additional pertinent items.     I have reviewed the Medications, Allergies, Past Medical and Surgical History,  and Social History in the Epic system.    Review of Systems   Constitutional: Positive for diaphoresis and fatigue.   HENT: Negative.    Respiratory: Positive for cough. Negative for shortness of breath.    Cardiovascular: Positive for chest pain. Negative for palpitations and leg swelling.   Gastrointestinal: Negative.    Genitourinary: Negative.    Musculoskeletal: Negative.    Skin: Negative.    Neurological: Negative.    All other systems reviewed and are negative.    A complete review of systems was performed with pertinent positives and negatives noted in the HPI, and all other systems negative.    Physical Exam   BP: 128/75  Pulse: 68  Temp: 98.7  F (37.1  C)  Resp: 16  Height: 182.9 cm (6')  Weight: 79.8 kg (176 lb)  SpO2: 99 %      Physical Exam  Vitals and nursing note reviewed.   Constitutional:       General: He is not in acute distress.     Appearance: He is well-developed.   HENT:      Head: Normocephalic and atraumatic.      Mouth/Throat:      Mouth: Mucous membranes are moist.   Eyes:      General: No scleral icterus.     Conjunctiva/sclera: Conjunctivae normal.      Pupils: Pupils are equal, round, and reactive to light.   Cardiovascular:      Rate and Rhythm: Normal rate and regular rhythm.      Heart sounds: Normal heart sounds.   Pulmonary:      Effort: Pulmonary effort is normal. No respiratory distress.      Breath sounds: Normal breath sounds. No wheezing.   Abdominal:      General: Abdomen is flat.      Palpations: Abdomen is soft.   Musculoskeletal:      Cervical back: Neck supple.   Skin:     General: Skin is warm and dry.   Neurological:      General: No focal deficit present.      Mental Status: He is alert and oriented to person, place, and time.      Cranial Nerves: No cranial nerve deficit.   Psychiatric:         Mood and Affect: Mood normal.         Behavior: Behavior normal.         ED Course     Procedures            Results for orders placed or performed during the hospital  encounter of 03/01/22 (from the past 24 hour(s))   CBC with platelets differential    Narrative    The following orders were created for panel order CBC with platelets differential.  Procedure                               Abnormality         Status                     ---------                               -----------         ------                     CBC with platelets and d...[[id number]]                      Final result                 Please view results for these tests on the individual orders.   Comprehensive metabolic panel   Result Value Ref Range    Sodium 137 133 - 144 mmol/L    Potassium 3.8 3.4 - 5.3 mmol/L    Chloride 106 94 - 109 mmol/L    Carbon Dioxide (CO2) 27 20 - 32 mmol/L    Anion Gap 4 3 - 14 mmol/L    Urea Nitrogen 10 7 - 30 mg/dL    Creatinine 0.69 0.66 - 1.25 mg/dL    Calcium 9.4 8.5 - 10.1 mg/dL    Glucose 63 (L) 70 - 99 mg/dL    Alkaline Phosphatase 74 40 - 150 U/L    AST 22 0 - 45 U/L    ALT 50 0 - 70 U/L    Protein Total 7.7 6.8 - 8.8 g/dL    Albumin 3.7 3.4 - 5.0 g/dL    Bilirubin Total 0.5 0.2 - 1.3 mg/dL    GFR Estimate >90 >60 mL/min/1.73m2   Lipase   Result Value Ref Range    Lipase 84 73 - 393 U/L   Troponin I   Result Value Ref Range    Troponin I High Sensitivity <3 <79 ng/L   CBC with platelets and differential   Result Value Ref Range    WBC Count 6.1 4.0 - 11.0 10e3/uL    RBC Count 4.68 4.40 - 5.90 10e6/uL    Hemoglobin 14.2 13.3 - 17.7 g/dL    Hematocrit 42.4 40.0 - 53.0 %    MCV 91 78 - 100 fL    MCH 30.3 26.5 - 33.0 pg    MCHC 33.5 31.5 - 36.5 g/dL    RDW 13.0 10.0 - 15.0 %    Platelet Count 228 150 - 450 10e3/uL    % Neutrophils 50 %    % Lymphocytes 37 %    % Monocytes 7 %    % Eosinophils 4 %    % Basophils 1 %    % Immature Granulocytes 1 %    NRBCs per 100 WBC 0 <1 /100    Absolute Neutrophils 3.1 1.6 - 8.3 10e3/uL    Absolute Lymphocytes 2.3 0.8 - 5.3 10e3/uL    Absolute Monocytes 0.5 0.0 - 1.3 10e3/uL    Absolute Eosinophils 0.2 0.0 - 0.7 10e3/uL    Absolute  Basophils 0.1 0.0 - 0.2 10e3/uL    Absolute Immature Granulocytes 0.0 <=0.4 10e3/uL    Absolute NRBCs 0.0 10e3/uL   Chest CT w/o contrast    Narrative    CT CHEST W/O CONTRAST 3/1/2022 1:57 PM    History: Lung nodule, 6-8mm    Comparison: CT chest 2/11/2022    Technique: CT of the chest was obtained without intravenous contrast.  Axial, coronal, and sagittal reconstructions were obtained and  reviewed.    Contrast: None    Findings:     Lungs: No pneumothorax, pleural effusion, or focal airspace opacity.  Similar appearance of biapical scarring. Pulmonary nodules as below:  -4 mm groundglass nodule in the left lower lobe, previously solid 5 mm  with surrounding groundglass component (series 6 image 158)  -New 4 mm nodule within the left upper lobe (series 6 image 136)  -9 x 5 mm nodule in the left upper lobe, previously 9 x 5 mm (series 6  image 83)  -3 mm nodule in the left upper lobe, increased 2 mm (series 6 image  118)  -Resolved prior nodule within in the right upper lobe laterally and   -new 2 mm nodule within the anterior right upper lobe (series 6 image  48)  Airways: Central tracheobronchial tree is clear.  Vessels: Main pulmonary artery and aorta are normal in caliber. Normal  three-vessel arch  Heart: Heart size is normal without pericardial effusion  Lymph nodes: Similar appearance of left hilar fullness measuring 31 x  30 mm, previously 36 x 28 mm (series 2 image 22).  Thyroid: Within normal limits.  Esophagus: Within normal limits    Upper abdomen: Within normal limits.    Bones and soft tissues: No suspicious axillary lymphadenopathy or soft  tissue mass. No suspicious osseous lesion.      Impression    Impression:   1. Slight decrease in size of left hilar lymphadenopathy which remains  suspicious for infectious or malignant etiology. Consider  transbronchial Biopsy or PET CT is still recommended.  2. Reduced size of previous new 7 mm nodule in the left lower lobe  which was likely  infectious/inflammatory in nature.  3. Remainder of pulmonary nodules are unchanged.    I have personally reviewed the examination and initial interpretation  and I agree with the findings.    DELANO DAVIS MD         SYSTEM ID:  [id number]     Medications   HYDROcodone-acetaminophen (NORCO) 5-325 MG per tablet 1 tablet (1 tablet Oral Given 3/1/22 1603)   ibuprofen (ADVIL/MOTRIN) tablet 600 mg (600 mg Oral Given 3/1/22 1603)             Assessments & Plan (with Medical Decision Making)   Three 2-year-old male who is currently being evaluated for possible TB based on multiple pulmonary nodules.  He had a clinic appointment today that he missed I believe he has been evaluated for endoscopic biopsy.  He was advised to come here instead of his clinic appointment his main complaint was right-sided chest pain.  All of his labs are normal today it seems to be chest wall in nature we did proceed with the CT of his chest that was on the schedule for today that actually shows improvement of his lymphadenopathy and unchanged other nodules.  Believe his symptoms represent chest wall pain its not pleuritic not cardiac.  He will be discharged home with a prescription for tramadol he was given ibuprofen and hydrocodone here.  I encouraged him to obtain a primary care provider for follow-up but to reschedule the pulmonary and infectious disease clinic appointments that he missed today.    I have reviewed the nursing notes.    I have reviewed the findings, diagnosis, plan and need for follow up with the patient.    New Prescriptions    TRAMADOL (ULTRAM) 50 MG TABLET    Take 1 tablet (50 mg) by mouth every 6 hours as needed for severe pain       Final diagnoses:   Chest wall pain       I, Joe Mcwilliams, am serving as a trained medical scribe to document services personally performed by Noah Lopez MD, based on the provider's statements to me.      I, Noah Lopez MD, was physically present and have reviewed and  verified the accuracy of this note documented by Joe Mcwilliams.     Noah Lopez MD  3/1/2022   [facility] EMERGENCY DEPARTMENT     Noah Lopez MD  03/01/22 [id number]

## 2022-03-01 NOTE — TELEPHONE ENCOUNTER
RECORDS RECEIVED FROM: Internal   DATE RECEIVED: 03.17.2022   NOTES (Gather within 2 years) STATUS DETAILS   OFFICE NOTE from referring provider   Internal 01.31.2022 Philip Wright MD   OFFICE NOTE from other specialist Internal  02.08.2022 Francesco Fontana MD   DISCHARGE SUMMARY from hospital Internal 01.31.2022 Philip Wright MD   LABS (any labs) Internal    MEDICATION LIST Internal    IMAGING  (NEED IMAGES AND REPORTS)     Other (anything related to diagnoses Internal 02.11.2022 CT CHEST W/O CONTRAST

## 2022-03-01 NOTE — DISCHARGE INSTRUCTIONS
Reschedule the appointment that you missed today  The CT scan is somewhat improved over the previous, your pulmonologist will go over this with you  All your other tests were normal  Use pain medication as needed

## 2022-03-01 NOTE — ED NOTES
Spoke with infection prevention regarding visitor policy for this patient. 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 Infection prevention said patient is allowed to have a visitor as long as they follow policy and remained masked in the room and are not walking around the ER.

## 2022-03-02 ENCOUNTER — TELEPHONE (OUTPATIENT)
Dept: PULMONOLOGY | Facility: CLINIC | Age: 32
End: 2022-03-02
Payer: COMMERCIAL

## 2022-03-02 NOTE — TELEPHONE ENCOUNTER
Called patient to schedule procedure with Dr. Helen Sherman, there was no answer.  Left message with my direct line [phone].      Sx spot holding 03/15

## 2022-03-03 NOTE — TELEPHONE ENCOUNTER
Called patient to schedule procedure with Dr. Helen Sherman, there was no answer.  Left message with my direct line [phone].

## 2022-03-17 ENCOUNTER — PRE VISIT (OUTPATIENT)
Dept: INFECTIOUS DISEASES | Facility: CLINIC | Age: 32
End: 2022-03-17
Payer: COMMERCIAL

## 2022-03-17 ENCOUNTER — VIRTUAL VISIT (OUTPATIENT)
Dept: INFECTIOUS DISEASES | Facility: CLINIC | Age: 32
End: 2022-03-17
Attending: STUDENT IN AN ORGANIZED HEALTH CARE EDUCATION/TRAINING PROGRAM
Payer: COMMERCIAL

## 2022-03-17 DIAGNOSIS — R91.8 PULMONARY NODULES: ICD-10-CM

## 2022-03-17 PROCEDURE — G0463 HOSPITAL OUTPT CLINIC VISIT: HCPCS | Mod: PN,RTG | Performed by: STUDENT IN AN ORGANIZED HEALTH CARE EDUCATION/TRAINING PROGRAM

## 2022-03-17 PROCEDURE — 99215 OFFICE O/P EST HI 40 MIN: CPT | Mod: 95 | Performed by: STUDENT IN AN ORGANIZED HEALTH CARE EDUCATION/TRAINING PROGRAM

## 2022-03-17 NOTE — LETTER
3/17/2022       RE: Jessica Farnsworth  [address] [facility] [address]     Dear Colleague,    Thank you for referring your patient, Jessica Farnsworth, to the [facility] [place] [facility]. Please see a copy of my visit note below.    Jessica is a 32 year old who is being evaluated via a billable video visit.      How would you like to obtain your AVS? MyChart  If the video visit is dropped, the invitation should be resent by: Text to cell phone: [phone]  Will anyone else be joining your video visit? no      Video Start Time: 3:50 pm  Video-Visit Details    Type of service:  Video Visit    Video End Time:4:33 pm    Originating Location (pt. Location):Home    Distant Location (provider location):  [facility] INFECTIOUS DISEASE [facility]     Platform used for Video Visit: Solar Notion       ID CLINIC FOLLOW UP VISIT     Patient:  Jessica Farnsworth   Date of birth 1990, Medical record number [id number]  Date of Visit:  2022  Date of Admission: 2022  Consult Requester:Philip Wright MD            Assessment and Recommendations:   Discussion:  32 year old male with a history of a recent COVID-19 infection (positive 22) and a potential latent TB (pt reports tried treatment twice on  [place]rival to [place] but developed allergic reaction to both drugs)  who presented to [facility] on 2022 with complaints of worsening left upper chest pain. On admission patient was diagnosed w/ left upper lung nodule and new left hilar lymphadenopathy seen on chest CT.    History of likely exposure TB when he was very young. His uncle in [place] had TB and  from it 28 years ago. His wife was recently exposed to a friend who was hospitalized for TB on Dec. 16th.  His wife has not felt well since that time but she also developed COVID-19 recently. Patient lost 15 pounds recently.      Per chart review, the patient went  to the [facility] ED on 1/20/22 due to left chest and shoulder pain. His EKG was normal, troponin negative, normal ProBNP (14), BMP with a glucose of 123 otherwise WNL, and his CXR showed infiltrates in both lung bases otherwise normal. At that time, he tested positive for COVID-19 [negative for Influenza A/B and RSV) and was sent home to quarantine and rest (also advised getting the COVID-19 vaccine when appropriate). CRP slightly elevated at 11, ESR elevated at 20, no lactic acidosis. His chest CT PE (1/31) showed no pulmonary embolus, aortic dissection, or aneurysm but new 3.7 x 2.4 cm left hilar lymphadenopathy with a 0.6 x 0.5 mm left upper lobe solid pulmonary nodule that appears tree-in-bud like; TTE done w showed no significant valvular abnormalities were noted and no vegetation or mass identified.     Patient reported having left upper chest pain for the last few weeks,  as well as a productive cough. The chest pain has been becoming more persistent  few days PTA. He has had night sweats every night since COVID-19 positive test. Less cough since he was admitted and started on antibiotics.     Hospital work up showed positive Q Gold TB assay, negative histo and negative blastomyces antigens. + SELINA 1:160 speckled. Inpatient ID (Dr Marsh) suspected pulmonary tuberculosis versus fungal infection versus non-infectious etiology of hilar adenopathy. Doubted that the large left hilar lymph nodes are due COVID-19 or a more routine bacterial pneumonia. Sputum AFBS were pending. Since cultures may return positive for AFB but these can take 2 weeks or more to become positive on culture, inpatient ID favored proceding with the bronchoscopy and BAL and EBUS guided biopsy of the left hilar lymph nodes. If the lymph nodes show granulomas consistent with M. tuberculous infection plan was to start the patient on 4 drug TB therapy with RIPE while waiting for cultures. Pulmonary was consulted as well. AFB smears x 3 were initially  negative and plans were made for outpatient follow up CT chest and EBUS/bronchoscopy after discharge.    At hospital follow up with primary 2/8, pt was advised to follow up with ID after bronch. Cultures came back positive for DARRYL.It appears he had a cancelled ID appt 2/22, then missed Pulm and ID appointments and was seen in ED 3/1. Repeat CT was done at this time and showed slight decrease in size of left hilar lymphadenopathy which remains suspicious for infectious or malignant etiology, per radiology considering transbronchial Biopsy or PET CT is still recommended. Also with reduced size of previous new 7 mm nodule in the left lower lobe which was likely infectious/inflammatory in nature, remainder of pulmonary nodules are unchanged. He also seemed to have some hemoptysis. He was again encouraged to follow up with ID and Pulm. It appears Pulm has been trying to reach him to schedule Bronch and had no answer.       Today's assessment:  Sputum AFBs from 1/31 with DARRYL. Repeat AFBS from 2/1 and 2/2 with NGTD now at 6 weeks. It is most likely he does not have active TB., but has latent TB based on negative sputums for TB until now. His DARRYL was only positive in 1 sputum. He has symptoms and consistent chest findings for DARRYL. However, to meet diagnostic criteria for DARRYL he needs 2 sputum AFBs or 1 bronch AFb positive. At this point it is unclear if DARRYL is the cause of his symptoms or chest CT findings although this is certainly possible. Treatment for DARRYL is long (15 months at least) and riddled with side effects so would prefer a conclusive diagnosis before initiating this. Would like to await bronch results and finalized AFBs before deciding on further course of action. This would also help evaluate for an alternate etiology of his symptoms such as malignancy or AI (given elevated SELINA)    RECOMMENDATION:  - We will wait till after pulmonology appointment, bronchoscopy and finalized sputum AFBs to decide if MAC needs  treatment  - Have sent a message to Pulmonology (Laly Cano) to reach out to pt again about bronchoscopy   - Would recommend proceeding with bronchoscopy, BAL and EBUS guided lymph node biopsy if pulmonary agrees. Please send BAL fluid for AFB smear and culture and fungal cultures and KOH. Also please send biopsy tissue specimens of the lymph node to the Infectious Disease Diagnostic lab for additional AFB tissue stains and cultures and fungal cultures.  - In addition to the standard histopathology for ask pathology to add stains for AFB and fungal organisms.   - Once this evaluation for acute illness is completed, we will eventually discuss treatment options for Latent TB. Since he appears to have had reactions to two drugs per report (details of specific agent and treatment location not in chart, to be pursued at follow up), we will need to find an appropriate regimen for this  - Follow up with me in 1 month  - He indicates being confused at not having a diagnosis. We spent a lot of time today goiing over the investigations so far and plan a few times.       More than 35  minutes spent counseling during today's visit      Jo Ann Sanders   of Medicine  Division of Infectious Diseases    Interval events       Pt reports cough, SOB, chest pain, occasional hemoptysis.     5 point ROS neg        Copied from initial History of Present Illness:     Jessica Farnsworth is a 32 year old male with a history of a recent COVID-19 infection (positive 1/20/22) and a potential latent TB (treated 2017?) and who presented to [facility] on 1/31/2022 with complaints of worsening left upper chest pain.  On admission patient was diagnosed w/ left upper lung nodule and new left hilar lymphadenopathy seen on chest CT.  On questioning by me the patient denies that he was treated for 6 to 9 months in the past with isoniazid. He does remember taking a white pill but only took it for 20 days and he thought it was for an  acne infection. He does give a history of likely exposure TB when he was very young. His uncle in [place] had TB and  from it 28 years ago. His wife was recently exposed to a friend who was hospitalized for TB on Dec. 16th.  His wife has not felt well since that time but she also developed COVID-19 recently. Patient states he lost 15 pounds recently.      Per chart review, the patient went to the [facility] ED on 22 due to left chest and shoulder pain. His EKG was normal, troponin negative, normal ProBNP (14), BMP with a glucose of 123 otherwise WNL, and his CXR showed infiltrates in both lung bases otherwise normal. At that time, he tested positive for COVID-19 [negative for Influenza A/B and RSV) and was sent home to quarantine and rest (also advised getting the COVID-19 vaccine when appropriate).      In the ED, CBC WNL, CMP WNL, lactate slightly low, CRP slightly elevated at 11, ESR elevated at 20, and a normal troponin. His blood cultures, quantiferon, and AFB culture and stain are pending. For imaging, his chest CT PE () showed no pulmonary embolus, aortic dissection, or aneurysm but new 3.7 x 2.4 cm left hilar lymphadenopathy with a 0.6 x 0.5 mm left upper lobe solid pulmonary nodule that appears tree-in-bud like; TTE done which showed no significant valvular abnormalities were noted and no vegetation or mass identified.     Patient reports having left upper chest pain for the last few weeks,  as well as a productive cough. The chest pain has been becoming more persistent these last few days. He has had night sweats every night since COVID-19 positive test. Less cough since he was admitted and started on antibiotics.       Patient is a never smoker. Patient reports no recreational drug use.   Works as a ; does not drive animals or chemical.   Denies exposures to smoke, mold, and animals.      Hospital work up to date this admission shows positive Q Gold TB assay, negative histo and negative  blastomyces antigens. + SELINA 1:160 speckled. CT noted able for new LULL nodule and left hilar lymphadenopathy.            Past Medical History:   No past medical history on file.  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        Past Surgical History:   No past surgical history on file.   No surgeries.          Family History:   No family history on file.   Father with DM  Uncle with TB         Social History:     Social History     Tobacco Use     Smoking status: Never Smoker     Smokeless tobacco: Never Used   Substance Use Topics     Alcohol use: Not Currently     History   Sexual Activity     Sexual activity: Not on file    X 1 year.  [facility]  Moved to [facility] age 2 years.  Moved to the  in 2016      Meds  Reviewed       Allergies:     Allergies   Allergen Reactions     Olopatadine Other (See Comments)     Worsening of conjunctivitis            Physical Exam:   No vitals done during this virtual visit    Limited video exam:  GENERAL:  well-developed, well-nourished, in bed in no acute distress.   HEENT:  Head is normocephalic, atraumatic   EYES:  Eyes have anicteric sclerae   LUNGS:  Breathing comfortably   SKIN:  No acute rashes  NEUROLOGIC:  Grossly nonfocal. Active x4 extremities         Laboratory Data:     Inflammatory Markers    Recent Labs   Lab Test 02/01/22  0644 01/31/22  1554 01/31/22  0111   SED  --  28* 20*   CRP 12.0* 13.0* 11.0*       Hematology Studies    Recent Labs   Lab Test 03/01/22  1344 02/04/22  0705 02/03/22  0745 02/02/22  0645 02/01/22  0644 01/31/22  0111   WBC 6.1 5.6 5.6 7.1 5.6 7.0   HGB 14.2 13.5 13.7 13.6 14.0 14.9   MCV 91 90 88 89 89 88    235 255 243 242 278       \\  Metabolic Studies     Recent Labs   Lab Test 03/01/22  1344 02/04/22  0705 02/03/22  0745 02/02/22  0645 01/31/22  0111    140 138 139 135   POTASSIUM 3.8 4.3 3.9 4.1 3.8   CHLORIDE 106 110* 109 108 105   CO2 27 25 26 24 27   BUN 10 10  7 8 9   CR 0.69 0.68 0.66 0.65* 0.80   GFRESTIMATED >90 >90 >90 >90 >90       Hepatic Studies    Recent Labs   Lab Test 03/01/22  1344 02/02/22  0645 01/31/22  0111   BILITOTAL 0.5 0.5 0.3   ALKPHOS 74 60 76   ALBUMIN 3.7 3.1* 4.0   AST 22 17 20   ALT 50 32 47       Radiology:   EXAM: CT CHEST PULMONARY EMBOLISM W CONTRAST, CT ABDOMEN PELVIS W CONTRAST  LOCATION: [facility]  DATE/TIME: 1/31/2022 2:58 AM    INDICATION: PE suspected, high prob.  COMPARISON: CT chest dated 11/14/2018.  TECHNIQUE: CT chest pulmonary angiogram during arterial phase injection of IV contrast. CT abdomen and pelvis were performed during the portal venous phase. Multiplanar reformats and MIP reconstructions were performed. Dose reduction techniques were   used.   CONTRAST: 107 mL Isovue 370.     FINDINGS:  ANGIOGRAM CHEST: Pulmonary arteries are normal caliber and negative for pulmonary emboli. Thoracic aorta is negative for dissection. No CT evidence of right heart strain.    LUNGS AND PLEURA: 5.8 x 4.5 mm solid nodular density in the left upper lobe (5, 26) that appears to extend along the bronchovascular margins. No pneumothorax or pleural effusion.    MEDIASTINUM/AXILLAE: Interval development of 3.7 x 2.4 cm left hilar lymphadenopathy.    CORONARY ARTERY CALCIFICATION: None.    ABDOMEN: Normal liver, gallbladder, spleen, adrenals, and pancreas. Normal kidneys. No obstructing renal or ureteral stones. No free air or fluid. No retroperitoneal or mesenteric lymphadenopathy.    PELVIS: Normal appendix. No obstruction, colitis, or diverticulitis. No free air or fluid. Normal bladder.    MUSCULOSKELETAL: Mild multilevel discogenic degenerative change. No joint effusions. No radiographic evidence of osteomyelitis.    IMPRESSION:  1. No pulmonary embolus, aortic dissection, or aneurysm.  2. New 3.7 x 2.4 cm left hilar lymphadenopathy with a 0.6 x 0.5 mm left upper lobe solid pulmonary nodule that  appears tree-in-bud like. Diagnostic considerations include infectious, inflammatory, and malignant etiologies to include tuberculosis.   Recommend follow-up to resolution.    Findings were discussed with Dr. Hopkins at 3:46 AM on 01/31/2021.    Imaging features are atypical or uncommonly reported for (COVID-19) pneumonia. Alternative diagnoses should be considered.

## 2022-03-17 NOTE — PATIENT INSTRUCTIONS
- You have two infections: Latent TB and MAC  - We will wait till after your pulmonology appointment and bronchoscopy to decide if MAC needs treatment - if it grows in bronchoscopy samples  - Will send a message to Pulmonology to reach out to you again about bronchoscopy   - Once this evaluation is completed, we will eventually discuss treatment options for Latent TB. Since you appear to have had reactions to two drugs, we will need to find an appropriate regimen for this  - Follow up with me in 1 month  - Call if any questions

## 2022-03-17 NOTE — PROGRESS NOTES
Jessica is a 32 year old who is being evaluated via a billable video visit.      How would you like to obtain your AVS? MyChart  If the video visit is dropped, the invitation should be resent by: Text to cell phone: [phone]  Will anyone else be joining your video visit? no      Video Start Time: 3:50 pm  Video-Visit Details    Type of service:  Video Visit    Video End Time:4:33 pm    Originating Location (pt. Location):Home    Distant Location (provider location):  [facility] [place]     Platform used for Video Visit: [employer] CLINIC FOLLOW UP VISIT     Patient:  Jessica Farnsworth   Date of birth 1990, Medical record number [id number]  Date of Visit:  2022  Date of Admission: 2022  Consult Requester:Philip Wright MD            Assessment and Recommendations:   Discussion:  32 year old male with a history of a recent COVID-19 infection (positive 22) and a potential latent TB (pt reports tried treatment twice on  [place] to [place] but developed allergic reaction to both drugs)  who presented to [facility] on 2022 with complaints of worsening left upper chest pain. On admission patient was diagnosed w/ left upper lung nodule and new left hilar lymphadenopathy seen on chest CT.    History of likely exposure TB when he was very young. His uncle in [place] had TB and  from it 28 years ago. His wife was recently exposed to a friend who was hospitalized for TB on Dec. 16th.  His wife has not felt well since that time but she also developed COVID-19 recently. Patient lost 15 pounds recently.      Per chart review, the patient went to the [facility] ED on 22 due to left chest and shoulder pain. His EKG was normal, troponin negative, normal ProBNP (14), BMP with a glucose of 123 otherwise WNL, and his CXR showed infiltrates in both lung bases otherwise normal. At that time, he tested positive for COVID-19 [negative for Influenza A/B and RSV) and was sent  home to quarantine and rest (also advised getting the COVID-19 vaccine when appropriate). CRP slightly elevated at 11, ESR elevated at 20, no lactic acidosis. His chest CT PE (1/31) showed no pulmonary embolus, aortic dissection, or aneurysm but new 3.7 x 2.4 cm left hilar lymphadenopathy with a 0.6 x 0.5 mm left upper lobe solid pulmonary nodule that appears tree-in-bud like; TTE done w showed no significant valvular abnormalities were noted and no vegetation or mass identified.     Patient reported having left upper chest pain for the last few weeks,  as well as a productive cough. The chest pain has been becoming more persistent  few days PTA. He has had night sweats every night since COVID-19 positive test. Less cough since he was admitted and started on antibiotics.     Hospital work up showed positive Q Gold TB assay, negative histo and negative blastomyces antigens. + SELINA 1:160 speckled. Inpatient ID (Dr Marsh) suspected pulmonary tuberculosis versus fungal infection versus non-infectious etiology of hilar adenopathy. Doubted that the large left hilar lymph nodes are due COVID-19 or a more routine bacterial pneumonia. Sputum AFBS were pending. Since cultures may return positive for AFB but these can take 2 weeks or more to become positive on culture, inpatient ID favored proceding with the bronchoscopy and BAL and EBUS guided biopsy of the left hilar lymph nodes. If the lymph nodes show granulomas consistent with M. tuberculous infection plan was to start the patient on 4 drug TB therapy with RIPE while waiting for cultures. Pulmonary was consulted as well. AFB smears x 3 were initially negative and plans were made for outpatient follow up CT chest and EBUS/bronchoscopy after discharge.    At hospital follow up with primary 2/8, pt was advised to follow up with ID after bronch. Cultures came back positive for DARRYL.It appears he had a cancelled ID appt 2/22, then missed Pulm and ID appointments and was seen in  ED 3/1. Repeat CT was done at this time and showed slight decrease in size of left hilar lymphadenopathy which remains suspicious for infectious or malignant etiology, per radiology considering transbronchial Biopsy or PET CT is still recommended. Also with reduced size of previous new 7 mm nodule in the left lower lobe which was likely infectious/inflammatory in nature, remainder of pulmonary nodules are unchanged. He also seemed to have some hemoptysis. He was again encouraged to follow up with ID and Pulm. It appears Pulm has been trying to reach him to schedule Bronch and had no answer.       Today's assessment:  Sputum AFBs from 1/31 with DARRYL. Repeat AFBS from 2/1 and 2/2 with NGTD now at 6 weeks. It is most likely he does not have active TB., but has latent TB based on negative sputums for TB until now. His DARRYL was only positive in 1 sputum. He has symptoms and consistent chest findings for DARRYL. However, to meet diagnostic criteria for DARRYL he needs 2 sputum AFBs or 1 bronch AFb positive. At this point it is unclear if DARRYL is the cause of his symptoms or chest CT findings although this is certainly possible. Treatment for DARRYL is long (15 months at least) and riddled with side effects so would prefer a conclusive diagnosis before initiating this. Would like to await bronch results and finalized AFBs before deciding on further course of action. This would also help evaluate for an alternate etiology of his symptoms such as malignancy or AI (given elevated SELINA)    RECOMMENDATION:  - We will wait till after pulmonology appointment, bronchoscopy and finalized sputum AFBs to decide if MAC needs treatment  - Have sent a message to Pulmonology ([facility]) to reach out to pt again about bronchoscopy   - Would recommend proceeding with bronchoscopy, BAL and EBUS guided lymph node biopsy if pulmonary agrees. Please send BAL fluid for AFB smear and culture and fungal cultures and KOH. Also please send biopsy tissue  specimens of the lymph node to the Infectious Disease Diagnostic lab for additional AFB tissue stains and cultures and fungal cultures.  - In addition to the standard histopathology for ask pathology to add stains for AFB and fungal organisms.   - Once this evaluation for acute illness is completed, we will eventually discuss treatment options for Latent TB. Since he appears to have had reactions to two drugs per report (details of specific agent and treatment location not in chart, to be pursued at follow up), we will need to find an appropriate regimen for this  - Follow up with me in 1 month  - He indicates being confused at not having a diagnosis. We spent a lot of time today goiing over the investigations so far and plan a few times.       More than 35  minutes spent counseling during today's visit      Jo Ann Sanders   of Medicine  Division of Infectious Diseases    Interval events       Pt reports cough, SOB, chest pain, occasional hemoptysis.     5 point ROS neg        Copied from initial History of Present Illness:     Jessica Farnsworth is a 32 year old male with a history of a recent COVID-19 infection (positive 22) and a potential latent TB (treated ?) and who presented to [facility] on 2022 with complaints of worsening left upper chest pain.  On admission patient was diagnosed w/ left upper lung nodule and new left hilar lymphadenopathy seen on chest CT.  On questioning by me the patient denies that he was treated for 6 to 9 months in the past with isoniazid. He does remember taking a white pill but only took it for 20 days and he thought it was for an acne infection. He does give a history of likely exposure TB when he was very young. His uncle in [place] had TB and  from it 28 years ago. His wife was recently exposed to a friend who was hospitalized for TB on Dec. 16th.  His wife has not felt well since that time but she also developed COVID-19 recently. Patient  states he lost 15 pounds recently.      Per chart review, the patient went to the [facility] ED on 1/20/22 due to left chest and shoulder pain. His EKG was normal, troponin negative, normal ProBNP (14), BMP with a glucose of 123 otherwise WNL, and his CXR showed infiltrates in both lung bases otherwise normal. At that time, he tested positive for COVID-19 [negative for Influenza A/B and RSV) and was sent home to quarantine and rest (also advised getting the COVID-19 vaccine when appropriate).      In the ED, CBC WNL, CMP WNL, lactate slightly low, CRP slightly elevated at 11, ESR elevated at 20, and a normal troponin. His blood cultures, quantiferon, and AFB culture and stain are pending. For imaging, his chest CT PE (1/31) showed no pulmonary embolus, aortic dissection, or aneurysm but new 3.7 x 2.4 cm left hilar lymphadenopathy with a 0.6 x 0.5 mm left upper lobe solid pulmonary nodule that appears tree-in-bud like; TTE done which showed no significant valvular abnormalities were noted and no vegetation or mass identified.     Patient reports having left upper chest pain for the last few weeks,  as well as a productive cough. The chest pain has been becoming more persistent these last few days. He has had night sweats every night since COVID-19 positive test. Less cough since he was admitted and started on antibiotics.       Patient is a never smoker. Patient reports no recreational drug use.   Works as a ; does not drive animals or chemical.   Denies exposures to smoke, mold, and animals.      Hospital work up to date this admission shows positive Q Gold TB assay, negative histo and negative blastomyces antigens. + SELINA 1:160 speckled. CT noted able for new LULL nodule and left hilar lymphadenopathy.            Past Medical History:   No past medical history on file.  History of Mucormycosis of toe in May 2019 [facility].        Past Surgical History:   No past surgical  history on file.   No surgeries.          Family History:   No family history on file.   Father with DM  Uncle with TB         Social History:     Social History     Tobacco Use     Smoking status: Never Smoker     Smokeless tobacco: Never Used   Substance Use Topics     Alcohol use: Not Currently     History   Sexual Activity     Sexual activity: Not on file    X 1 year.  Born in [place]  Moved to [place] age 2 years.  [place]oved [place] the [place] in [place]      Meds  Reviewed       Allergies:     Allergies   Allergen Reactions     Olopatadine Other (See Comments)     Worsening of conjunctivitis            Physical Exam:   No vitals done during this virtual visit    Limited video exam:  GENERAL:  well-developed, well-nourished, in bed in no acute distress.   HEENT:  Head is normocephalic, atraumatic   EYES:  Eyes have anicteric sclerae   LUNGS:  Breathing comfortably   SKIN:  No acute rashes  NEUROLOGIC:  Grossly nonfocal. Active x4 extremities         Laboratory Data:     Inflammatory Markers    Recent Labs   Lab Test 02/01/22  0644 01/31/22  1554 01/31/22  0111   SED  --  28* 20*   CRP 12.0* 13.0* 11.0*       Hematology Studies    Recent Labs   Lab Test 03/01/22  1344 02/04/22  0705 02/03/22  0745 02/02/22  0645 02/01/22  0644 01/31/22  0111   WBC 6.1 5.6 5.6 7.1 5.6 7.0   HGB 14.2 13.5 13.7 13.6 14.0 14.9   MCV 91 90 88 89 89 88    235 255 243 242 278       \\  Metabolic Studies     Recent Labs   Lab Test 03/01/22  1344 02/04/22  0705 02/03/22  0745 02/02/22  0645 01/31/22  0111    140 138 139 135   POTASSIUM 3.8 4.3 3.9 4.1 3.8   CHLORIDE 106 110* 109 108 105   CO2 27 25 26 24 27   BUN 10 10 7 8 9   CR 0.69 0.68 0.66 0.65* 0.80   GFRESTIMATED >90 >90 >90 >90 >90       Hepatic Studies    Recent Labs   Lab Test 03/01/22  1344 02/02/22  0645 01/31/22  0111   BILITOTAL 0.5 0.5 0.3   ALKPHOS 74 60 76   ALBUMIN 3.7 3.1* 4.0   AST 22 17 20   ALT 50 32 47       Radiology:   EXAM: CT CHEST PULMONARY EMBOLISM W  CONTRAST, CT ABDOMEN PELVIS W CONTRAST  LOCATION: [facility]  DATE/TIME: 1/31/2022 2:58 AM    INDICATION: PE suspected, high prob.  COMPARISON: CT chest dated 11/14/2018.  TECHNIQUE: CT chest pulmonary angiogram during arterial phase injection of IV contrast. CT abdomen and pelvis were performed during the portal venous phase. Multiplanar reformats and MIP reconstructions were performed. Dose reduction techniques were   used.   CONTRAST: 107 mL Isovue 370.     FINDINGS:  ANGIOGRAM CHEST: Pulmonary arteries are normal caliber and negative for pulmonary emboli. Thoracic aorta is negative for dissection. No CT evidence of right heart strain.    LUNGS AND PLEURA: 5.8 x 4.5 mm solid nodular density in the left upper lobe (5, 26) that appears to extend along the bronchovascular margins. No pneumothorax or pleural effusion.    MEDIASTINUM/AXILLAE: Interval development of 3.7 x 2.4 cm left hilar lymphadenopathy.    CORONARY ARTERY CALCIFICATION: None.    ABDOMEN: Normal liver, gallbladder, spleen, adrenals, and pancreas. Normal kidneys. No obstructing renal or ureteral stones. No free air or fluid. No retroperitoneal or mesenteric lymphadenopathy.    PELVIS: Normal appendix. No obstruction, colitis, or diverticulitis. No free air or fluid. Normal bladder.    MUSCULOSKELETAL: Mild multilevel discogenic degenerative change. No joint effusions. No radiographic evidence of osteomyelitis.    IMPRESSION:  1. No pulmonary embolus, aortic dissection, or aneurysm.  2. New 3.7 x 2.4 cm left hilar lymphadenopathy with a 0.6 x 0.5 mm left upper lobe solid pulmonary nodule that appears tree-in-bud like. Diagnostic considerations include infectious, inflammatory, and malignant etiologies to include tuberculosis.   Recommend follow-up to resolution.    Findings were discussed with Dr. Hopkins at 3:46 AM on 01/31/2021.    Imaging features are atypical or uncommonly reported for (COVID-19)  pneumonia. Alternative diagnoses should be considered.

## 2022-03-21 NOTE — TELEPHONE ENCOUNTER
Called patient to schedule procedure with Dr. Helen Sherman, there was no answer.  Left message with my direct line [phone].

## 2022-03-23 NOTE — TELEPHONE ENCOUNTER
Called patient to schedule procedure with Dr. Helen Sherman, there was no answer.  Left message with my direct line [phone].

## 2022-03-27 ENCOUNTER — HEALTH MAINTENANCE LETTER (OUTPATIENT)
Age: 32
End: 2022-03-27

## 2022-03-29 LAB
ACID FAST STAIN (ARUP): NORMAL
ACID FAST STAIN (ARUP): NORMAL

## 2022-03-30 LAB
ACID FAST STAIN (ARUP): NORMAL
ACID FAST STAIN (ARUP): NORMAL

## 2022-04-06 ENCOUNTER — TELEPHONE (OUTPATIENT)
Dept: INFECTIOUS DISEASES | Facility: CLINIC | Age: 32
End: 2022-04-06
Payer: COMMERCIAL

## 2022-04-12 ENCOUNTER — APPOINTMENT (OUTPATIENT)
Dept: INTERPRETER SERVICES | Facility: CLINIC | Age: 32
End: 2022-04-12
Payer: COMMERCIAL

## 2022-04-12 ENCOUNTER — PATIENT OUTREACH (OUTPATIENT)
Dept: PULMONOLOGY | Facility: CLINIC | Age: 32
End: 2022-04-12
Payer: COMMERCIAL

## 2022-04-12 DIAGNOSIS — R91.8 PULMONARY NODULES: Primary | ICD-10-CM

## 2022-04-12 DIAGNOSIS — R59.1 LYMPHADENOPATHY: ICD-10-CM

## 2022-04-12 NOTE — TELEPHONE ENCOUNTER
Patient called in,     Via , patient stated he is not interested in scheduling a procedure at this time with Dr. Helen Sherman.  Patient said he would like an updated CT scan.     Advised; writer would send a message to his care team.   Patient had no other questions.     Message to care team was sent    Laly Cano  [facility]-Op Coordinator  [phone]

## 2022-04-20 DIAGNOSIS — R91.8 PULMONARY NODULES: Primary | ICD-10-CM

## 2022-04-20 DIAGNOSIS — R59.1 LYMPHADENOPATHY: ICD-10-CM

## 2022-04-20 NOTE — PROGRESS NOTES
Sent a letter to patient regarding the possibility that the mass may grow/worsen if he does not have a bronch as recommended.    Attempts to reach the patient have been unsuccessful.  Messages left and calls have not been returned to discuss.

## 2022-05-13 ENCOUNTER — TELEPHONE (OUTPATIENT)
Dept: PULMONOLOGY | Facility: CLINIC | Age: 32
End: 2022-05-13
Payer: COMMERCIAL

## 2022-05-13 ENCOUNTER — HOSPITAL ENCOUNTER (OUTPATIENT)
Facility: CLINIC | Age: 32
End: 2022-05-13
Attending: INTERNAL MEDICINE | Admitting: INTERNAL MEDICINE
Payer: COMMERCIAL

## 2022-05-13 NOTE — TELEPHONE ENCOUNTER
Spoke with wife to schedule procedure with Tato Jerome    Procedure was scheduled on 06/10 at [facility] OR  Patient will have H&P with [facility]    Patient is aware a COVID-19 test is needed before their procedure. The test should be with-in 4 days of their procedure.   Test Details: Wife will schedule at same time as H&P    Patient is aware a / is needed day of surgery.   Surgery letter was sent via Mail, patient has my direct contact information for any further questions.     Note:   Asked her to call me back if she was unable to schedule H&P and COVID test. She did not have the name of the PCP but she knew it's in the [facility].

## 2022-05-24 ENCOUNTER — PREP FOR PROCEDURE (OUTPATIENT)
Dept: PULMONOLOGY | Facility: CLINIC | Age: 32
End: 2022-05-24
Payer: COMMERCIAL

## 2022-05-25 DIAGNOSIS — Z11.59 ENCOUNTER FOR SCREENING FOR OTHER VIRAL DISEASES: Primary | ICD-10-CM

## 2022-09-25 ENCOUNTER — HEALTH MAINTENANCE LETTER (OUTPATIENT)
Age: 32
End: 2022-09-25

## 2023-02-01 DIAGNOSIS — Z31.41 FERTILITY TESTING: Primary | ICD-10-CM

## 2023-02-05 ENCOUNTER — APPOINTMENT (OUTPATIENT)
Dept: GENERAL RADIOLOGY | Facility: CLINIC | Age: 33
End: 2023-02-05
Attending: STUDENT IN AN ORGANIZED HEALTH CARE EDUCATION/TRAINING PROGRAM
Payer: COMMERCIAL

## 2023-02-05 ENCOUNTER — HOSPITAL ENCOUNTER (EMERGENCY)
Facility: CLINIC | Age: 33
Discharge: HOME | End: 2023-02-05
Attending: STUDENT IN AN ORGANIZED HEALTH CARE EDUCATION/TRAINING PROGRAM | Admitting: STUDENT IN AN ORGANIZED HEALTH CARE EDUCATION/TRAINING PROGRAM
Payer: COMMERCIAL

## 2023-02-05 VITALS
RESPIRATION RATE: 18 BRPM | BODY MASS INDEX: 23.89 KG/M2 | DIASTOLIC BLOOD PRESSURE: 70 MMHG | HEART RATE: 60 BPM | SYSTOLIC BLOOD PRESSURE: 120 MMHG | WEIGHT: 176.37 LBS | OXYGEN SATURATION: 97 % | TEMPERATURE: 98.6 F | HEIGHT: 72 IN

## 2023-02-05 DIAGNOSIS — M79.672 LEFT FOOT PAIN: ICD-10-CM

## 2023-02-05 LAB
ANION GAP SERPL CALCULATED.3IONS-SCNC: 10 MMOL/L (ref 7–15)
BASOPHILS # BLD AUTO: 0.1 10E3/UL (ref 0–0.2)
BASOPHILS NFR BLD AUTO: 1 %
BUN SERPL-MCNC: 11 MG/DL (ref 6–20)
CALCIUM SERPL-MCNC: 9.9 MG/DL (ref 8.6–10)
CHLORIDE SERPL-SCNC: 102 MMOL/L (ref 98–107)
CREAT SERPL-MCNC: 0.8 MG/DL (ref 0.67–1.17)
CRP SERPL-MCNC: 3.7 MG/L
DEPRECATED HCO3 PLAS-SCNC: 25 MMOL/L (ref 22–29)
EOSINOPHIL # BLD AUTO: 0.3 10E3/UL (ref 0–0.7)
EOSINOPHIL NFR BLD AUTO: 5 %
ERYTHROCYTE [DISTWIDTH] IN BLOOD BY AUTOMATED COUNT: 12.6 % (ref 10–15)
ERYTHROCYTE [SEDIMENTATION RATE] IN BLOOD BY WESTERGREN METHOD: 7 MM/HR (ref 0–15)
GFR SERPL CREATININE-BSD FRML MDRD: >90 ML/MIN/1.73M2
GLUCOSE SERPL-MCNC: 90 MG/DL (ref 70–99)
HCT VFR BLD AUTO: 43.1 % (ref 40–53)
HGB BLD-MCNC: 14.8 G/DL (ref 13.3–17.7)
IMM GRANULOCYTES # BLD: 0 10E3/UL
IMM GRANULOCYTES NFR BLD: 0 %
LYMPHOCYTES # BLD AUTO: 2.7 10E3/UL (ref 0.8–5.3)
LYMPHOCYTES NFR BLD AUTO: 42 %
MCH RBC QN AUTO: 30.6 PG (ref 26.5–33)
MCHC RBC AUTO-ENTMCNC: 34.3 G/DL (ref 31.5–36.5)
MCV RBC AUTO: 89 FL (ref 78–100)
MONOCYTES # BLD AUTO: 0.4 10E3/UL (ref 0–1.3)
MONOCYTES NFR BLD AUTO: 7 %
NEUTROPHILS # BLD AUTO: 2.9 10E3/UL (ref 1.6–8.3)
NEUTROPHILS NFR BLD AUTO: 45 %
NRBC # BLD AUTO: 0 10E3/UL
NRBC BLD AUTO-RTO: 0 /100
PLATELET # BLD AUTO: 234 10E3/UL (ref 150–450)
POTASSIUM SERPL-SCNC: 4.3 MMOL/L (ref 3.4–5.3)
RBC # BLD AUTO: 4.83 10E6/UL (ref 4.4–5.9)
SODIUM SERPL-SCNC: 137 MMOL/L (ref 136–145)
WBC # BLD AUTO: 6.3 10E3/UL (ref 4–11)

## 2023-02-05 PROCEDURE — 99284 EMERGENCY DEPT VISIT MOD MDM: CPT | Performed by: STUDENT IN AN ORGANIZED HEALTH CARE EDUCATION/TRAINING PROGRAM

## 2023-02-05 PROCEDURE — 73630 X-RAY EXAM OF FOOT: CPT | Mod: 26 | Performed by: RADIOLOGY

## 2023-02-05 PROCEDURE — 85652 RBC SED RATE AUTOMATED: CPT | Performed by: STUDENT IN AN ORGANIZED HEALTH CARE EDUCATION/TRAINING PROGRAM

## 2023-02-05 PROCEDURE — 86140 C-REACTIVE PROTEIN: CPT | Performed by: STUDENT IN AN ORGANIZED HEALTH CARE EDUCATION/TRAINING PROGRAM

## 2023-02-05 PROCEDURE — 73630 X-RAY EXAM OF FOOT: CPT | Mod: LT

## 2023-02-05 PROCEDURE — 85025 COMPLETE CBC W/AUTO DIFF WBC: CPT | Performed by: STUDENT IN AN ORGANIZED HEALTH CARE EDUCATION/TRAINING PROGRAM

## 2023-02-05 PROCEDURE — 36415 COLL VENOUS BLD VENIPUNCTURE: CPT | Performed by: STUDENT IN AN ORGANIZED HEALTH CARE EDUCATION/TRAINING PROGRAM

## 2023-02-05 PROCEDURE — 80048 BASIC METABOLIC PNL TOTAL CA: CPT | Performed by: STUDENT IN AN ORGANIZED HEALTH CARE EDUCATION/TRAINING PROGRAM

## 2023-02-05 PROCEDURE — 250N000013 HC RX MED GY IP 250 OP 250 PS 637: Performed by: STUDENT IN AN ORGANIZED HEALTH CARE EDUCATION/TRAINING PROGRAM

## 2023-02-05 RX ORDER — IBUPROFEN 600 MG/1
600 TABLET, FILM COATED ORAL ONCE
Status: COMPLETED | OUTPATIENT
Start: 2023-02-05 | End: 2023-02-05

## 2023-02-05 RX ADMIN — IBUPROFEN 600 MG: 600 TABLET ORAL at 21:32

## 2023-02-05 ASSESSMENT — ACTIVITIES OF DAILY LIVING (ADL): ADLS_ACUITY_SCORE: 35

## 2023-02-06 NOTE — ED TRIAGE NOTES
34 y/o male c/o L foot pain x 4 days. Per spouse pain has become progressively worse. Pain to sole of foot with redness noted.

## 2023-02-06 NOTE — DISCHARGE INSTRUCTIONS
You were seen today for foot pain.  Your blood work and x-ray were normal.  You likely have a condition called plantar fasciitis.  Please follow up with primary care.  Return to the ED with new or worsening symptoms.

## 2023-02-06 NOTE — ED PROVIDER NOTES
ED Provider Note  [facility]      History     Chief Complaint   Patient presents with     Foot Pain     HPI  Jessica Farnsworth is a 33 year old healthy male who presents with 4 days of pain to left foot.  Reports severe tenderness and pain with ambulation.  No known injuries to the foot.  No fevers, chills or other infectious symptoms.  Believes there is erythema to the foot as well.  Wife provides additional history.  No chest pain or difficulty breathing. No other pain or injury.    Pertinent past medical, surgical, family and social history reviewed with patient and in Epic as able.      A medically appropriate review of systems was performed with pertinent positives and negatives noted in the HPI, and all other systems negative.    Physical Exam   BP: 117/76  Pulse: 57  Temp: 98.6  F (37  C)  Resp: 20  Height: 182.9 cm (6')  Weight: 80 kg (176 lb 5.9 oz)  SpO2: 100 %  Physical Exam  General: no acute distress. Appears stated age.   HENT: MMM, no oropharyngeal lesions  Eyes: PERRL, normal sclerae  Neck: non-tender, supple  Cardio: Regular rate. Regular rhythm. Extremities well perfused  Resp: Normal work of breathing, normal respiratory rate.  Chest/Back: no visual signs of trauma, no CVA tenderness  Abdomen: no tenderness, non-distended, no rebound, no guarding  Neuro: alert and fully oriented. CN II-XII grossly intact. Grossly normal strength and sensation in all extremities.   MSK: no deformities. Pain and mild erythema to sole of left foot.  No wounds or obvious abscess.  Mildly warm to the touch.   Integumentary/Skin: no rash visualized, normal color  Psych: normal affect, normal behavior      ED Course, Procedures, & Data      Procedures                   Results for orders placed or performed during the hospital encounter of 02/05/23   Foot XR, G/E 3 views, left     Status: None    Narrative    EXAM: XR FOOT LEFT G/E 3 VIEWS  LOC[facility]: [facility]  DATE/TIME: 2/5/2023 8:52 PM    INDICATION: left foot pain  COMPARISON: None.      Impression    IMPRESSION: Normal joint spaces and alignment. No fracture.   Basic metabolic panel     Status: Normal   Result Value Ref Range    Sodium 137 136 - 145 mmol/L    Potassium 4.3 3.4 - 5.3 mmol/L    Chloride 102 98 - 107 mmol/L    Carbon Dioxide (CO2) 25 22 - 29 mmol/L    Anion Gap 10 7 - 15 mmol/L    Urea Nitrogen 11.0 6.0 - 20.0 mg/dL    Creatinine 0.80 0.67 - 1.17 mg/dL    Calcium 9.9 8.6 - 10.0 mg/dL    Glucose 90 70 - 99 mg/dL    GFR Estimate >90 >60 mL/min/1.73m2   Erythrocyte sedimentation rate auto     Status: Normal   Result Value Ref Range    Erythrocyte Sedimentation Rate 7 0 - 15 mm/hr   CRP inflammation     Status: Normal   Result Value Ref Range    CRP Inflammation 3.70 <5.00 mg/L   CBC with platelets and differential     Status: None   Result Value Ref Range    WBC Count 6.3 4.0 - 11.0 10e3/uL    RBC Count 4.83 4.40 - 5.90 10e6/uL    Hemoglobin 14.8 13.3 - 17.7 g/dL    Hematocrit 43.1 40.0 - 53.0 %    MCV 89 78 - 100 fL    MCH 30.6 26.5 - 33.0 pg    MCHC 34.3 31.5 - 36.5 g/dL    RDW 12.6 10.0 - 15.0 %    Platelet Count 234 150 - 450 10e3/uL    % Neutrophils 45 %    % Lymphocytes 42 %    % Monocytes 7 %    % Eosinophils 5 %    % Basophils 1 %    % Immature Granulocytes 0 %    NRBCs per 100 WBC 0 <1 /100    Absolute Neutrophils 2.9 1.6 - 8.3 10e3/uL    Absolute Lymphocytes 2.7 0.8 - 5.3 10e3/uL    Absolute Monocytes 0.4 0.0 - 1.3 10e3/uL    Absolute Eosinophils 0.3 0.0 - 0.7 10e3/uL    Absolute Basophils 0.1 0.0 - 0.2 10e3/uL    Absolute Immature Granulocytes 0.0 <=0.4 10e3/uL    Absolute NRBCs 0.0 10e3/uL   CBC with platelets differential     Status: None    Narrative    The following orders were created for panel order CBC with platelets differential.  Procedure                               Abnormality         Status                     ---------                               -----------          ------                     CBC with platelets and d...[[id number]]                      Final result                 Please view results for these tests on the individual orders.     Medications   ibuprofen (ADVIL/MOTRIN) tablet 600 mg (600 mg Oral Given 2/5/23 2132)     Labs Ordered and Resulted from Time of ED Arrival to Time of ED Departure   BASIC METABOLIC PANEL - Normal       Result Value    Sodium 137      Potassium 4.3      Chloride 102      Carbon Dioxide (CO2) 25      Anion Gap 10      Urea Nitrogen 11.0      Creatinine 0.80      Calcium 9.9      Glucose 90      GFR Estimate >90     ERYTHROCYTE SEDIMENTATION RATE AUTO - Normal    Erythrocyte Sedimentation Rate 7     CRP INFLAMMATION - Normal    CRP Inflammation 3.70     CBC WITH PLATELETS AND DIFFERENTIAL    WBC Count 6.3      RBC Count 4.83      Hemoglobin 14.8      Hematocrit 43.1      MCV 89      MCH 30.6      MCHC 34.3      RDW 12.6      Platelet Count 234      % Neutrophils 45      % Lymphocytes 42      % Monocytes 7      % Eosinophils 5      % Basophils 1      % Immature Granulocytes 0      NRBCs per 100 WBC 0      Absolute Neutrophils 2.9      Absolute Lymphocytes 2.7      Absolute Monocytes 0.4      Absolute Eosinophils 0.3      Absolute Basophils 0.1      Absolute Immature Granulocytes 0.0      Absolute NRBCs 0.0       Foot XR, G/E 3 views, left   Final Result   IMPRESSION: Normal joint spaces and alignment. No fracture.             Medical Decision Making  The patient's presentation is strongly suggestive of an acute and uncomplicated illness or injury.    The patient's evaluation involved:  an assessment requiring an independent historian (wife)  ordering and/or review of 3+ test(s) in this encounter (see separate area of note for details)  independent interpretation of testing performed by another health professional (see separate area of note for details)    The patient's management involved prescription drug management (including medications  given in the ED) and limitations due to social determinants of health (non English speaking).      Assessment & Plan    Jessica Farnsworth is a healthy 33 year old male with 4 days of left foot pain.  Vital signs within normal limits.  Exam with severe tenderness, but intact ROM, no wounds or obvious infection.  Screening blood work for infection for significant pain.  XR left foot.    CBC, BMP, ESR, CRP all within normal limits.  X-ray left foot without acute fracture.  Symptomatically improved with ibuprofen.  Likely plantar fasciitis or soft tissue injury.  Provided post op shoe for comfort.  Recommended follow up with PCP or orthopedics.  Tylenol and ibuprofen for pain.  Rest, ice and supportive shoes recommended.  Return for new or worsening symptoms.     I have reviewed the nursing notes. I have reviewed the findings, diagnosis, plan and need for follow up with the patient.    Discharge Medication List as of 2/5/2023  9:33 PM          Final diagnoses:   Left foot pain       Lucero Barragan MD  [facility] EMERGENCY DEPARTMENT  2/5/2023     Lucero Barragan MD  02/07/23 1042

## 2023-05-08 ENCOUNTER — HEALTH MAINTENANCE LETTER (OUTPATIENT)
Age: 33
End: 2023-05-08

## 2024-01-19 ENCOUNTER — TRANSFERRED RECORDS (OUTPATIENT)
Dept: HEALTH INFORMATION MANAGEMENT | Facility: CLINIC | Age: 34
End: 2024-01-19
Payer: COMMERCIAL

## 2024-01-25 ENCOUNTER — APPOINTMENT (OUTPATIENT)
Dept: INTERPRETER SERVICES | Facility: CLINIC | Age: 34
End: 2024-01-25
Payer: COMMERCIAL

## 2024-01-25 ENCOUNTER — APPOINTMENT (OUTPATIENT)
Dept: GENERAL RADIOLOGY | Facility: CLINIC | Age: 34
End: 2024-01-25
Attending: EMERGENCY MEDICINE
Payer: COMMERCIAL

## 2024-01-25 ENCOUNTER — HOSPITAL ENCOUNTER (EMERGENCY)
Facility: CLINIC | Age: 34
Discharge: HOME | End: 2024-01-25
Attending: EMERGENCY MEDICINE | Admitting: EMERGENCY MEDICINE
Payer: COMMERCIAL

## 2024-01-25 VITALS
SYSTOLIC BLOOD PRESSURE: 120 MMHG | DIASTOLIC BLOOD PRESSURE: 80 MMHG | RESPIRATION RATE: 18 BRPM | OXYGEN SATURATION: 99 % | TEMPERATURE: 97.1 F | HEART RATE: 60 BPM

## 2024-01-25 DIAGNOSIS — S20.212A CHEST WALL CONTUSION, LEFT, INITIAL ENCOUNTER: ICD-10-CM

## 2024-01-25 DIAGNOSIS — S16.1XXA CERVICAL STRAIN, INITIAL ENCOUNTER: ICD-10-CM

## 2024-01-25 DIAGNOSIS — S09.90XA CLOSED HEAD INJURY, INITIAL ENCOUNTER: ICD-10-CM
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
## 2024-01-25 DIAGNOSIS — S60.222A CONTUSION OF LEFT HAND, INITIAL ENCOUNTER: ICD-10-CM

## 2024-01-25 PROCEDURE — 72040 X-RAY EXAM NECK SPINE 2-3 VW: CPT

## 2024-01-25 PROCEDURE — 71046 X-RAY EXAM CHEST 2 VIEWS: CPT

## 2024-01-25 PROCEDURE — 250N000013 HC RX MED GY IP 250 OP 250 PS 637: Performed by: EMERGENCY MEDICINE

## 2024-01-25 PROCEDURE — 99285 EMERGENCY DEPT VISIT HI MDM: CPT | Mod: 25

## 2024-01-25 PROCEDURE — 73130 X-RAY EXAM OF HAND: CPT | Mod: LT

## 2024-01-25 RX ORDER — METHYLPREDNISOLONE 4 MG
TABLET, DOSE PACK ORAL
Qty: 21 TABLET | Refills: 0 | Status: SHIPPED | OUTPATIENT
Start: 2024-01-25 | End: 2024-03-04

## 2024-01-25 RX ORDER — TRAMADOL HYDROCHLORIDE 50 MG/1
50 TABLET ORAL EVERY 6 HOURS PRN
Qty: 10 TABLET | Refills: 0 | Status: SHIPPED | OUTPATIENT
Start: 2024-01-25 | End: 2024-01-28

## 2024-01-25 RX ORDER — LIDOCAINE 4 G/G
1 PATCH TOPICAL ONCE
Status: DISCONTINUED | OUTPATIENT
Start: 2024-01-25 | End: 2024-01-25 | Stop reason: HOSPADM

## 2024-01-25 RX ADMIN — LIDOCAINE 1 PATCH: 560 PATCH PERCUTANEOUS; TOPICAL; TRANSDERMAL at 20:44

## 2024-01-25 ASSESSMENT — ACTIVITIES OF DAILY LIVING (ADL)
ADLS_ACUITY_SCORE: 35
ADLS_ACUITY_SCORE: 35
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
## 2024-01-26 NOTE — DISCHARGE INSTRUCTIONS
Discharge Instructions  Chest Injury    You have been seen today because of a chest injury.  You may have contusion (bruise) of the chest or a rib fracture (broken bone).  Rib fractures can be hard to see on x-ray, so we cannot always be sure whether your rib is broken or bruised. Fortunately, the treatment of these injuries is usually the same, and includes pain control and preventing complications.    Generally, every Emergency Department visit should have a follow-up clinic visit with either a primary or a specialty clinic/provider. Please follow-up as instructed by your emergency provider today.    Return to the Emergency Department if:  You become short of breath.  You develop a fever over 101.5 F.  You pass out or become very weak or pale.  You have abdominal (belly) pain that is new or increasing.  You cough up blood.  You have new symptoms or anything that worries you.    Follow-up with your provider:  As directed by your provider today.  If you are not improved in two weeks.  If you need more pain medicine, since we do not refill pain pills through the Emergency Department.    Home care instructions:  Chest injuries can be painful.  You may take an over-the-counter pain medication such as Tylenol  (acetaminophen), Advil  (ibuprofen), Motrin  (ibuprofen) or Aleve  (naproxen).  Applying ice packs to the painful area can help your pain.   Holding a pillow against your chest can help with pain when you need to move or cough.  You may need to rest and avoid lifting particularly in the first few days after your injury.  Prevention of pneumonia (lung infection) is also a part of managing chest injuries.  Because it can hurt to take deep breaths, you could develop collapsed areas of lung that can develop infection.  To prevent this, you need to take ten very deep breaths every hour while you are awake. Sometimes you will be given a device called an incentive spirometer to help with this. You also need to make  yourself cough every hour.  Rib belts or binders are not generally recommended, since they may increase the risk of pneumonia. If you do use one, use it for only short periods of time.   If you were given a prescription for medicine here today, be sure to read all of the information (including the package insert) that comes with your prescription.  This will include important information about the medicine, its side effects, and any warnings that you need to know about.  The pharmacist who fills the prescription can provide more information and answer questions you may have about the medicine.  If you have questions or concerns that the pharmacist cannot address, please call or return to the Emergency Department.   Remember that you can always come back to the Emergency Department if you are not able to see your regular provider in the amount of time listed above, if you get any new symptoms, or if there is anything that worries you.      Discharge Instructions  Concussion    You were seen today for signs of a concussion.  The symptoms will vary, depending on the nature of your injury and your health. You may have: headache, confusion, nausea (feel sick to your stomach), vomiting (throwing up) and problems with memory, concentrating, or sleep. You may feel dizzy, irritable, and tired. Children and teens may need help from their parents, teachers, and coaches to watch for symptoms as they recover.    Generally, every Emergency Department visit should have a follow-up clinic visit with either a primary or a specialty clinic/provider. Please follow-up as instructed by your emergency provider today.     Return to the Emergency Department if:  Your headache gets worse or you start to have a really bad headache even with the recommended treatment plan.   You feel drowsier, have growing confusion, or slurred speech.   You keep repeating yourself.   You have strange behavior or are feeling more irritable.   You have a  seizure.   You vomit (throw up) more than once.   You have trouble walking.   You have weakness or numbness.  Your neck pain gets worse.   You have a loss of consciousness.   You have blood for fluid coming from your ears or nose.   You have new symptoms or anything that worries you.     Home Care:  Get lots of rest and get enough sleep at night. Take daytime naps or rest if you feel tired.   Limit physical activity and  thinking  activities. These can make symptoms worse.   Physical activities include gym, sports, weight training, running, exercise, and heavy lifting.   Thinking activities include homework, class work, job-related work, and screen time (phone, computer, tablet, TV, and video games).   Stick to a healthy diet and drink lots of fluids. Avoid alcohol.  As symptoms improve, you may slowly return to your daily activities. If symptoms get worse or return, reduce your activity.   Know that it is normal to feel sad or frustrated when you do not feel right and are less active.     Going Back to Work:  Your care team will tell you when you are ready to return to work.    Limit the amount of work you do soon after your injury. This may speed healing. Take breaks if your symptoms get worse. You should also reduce your physical activity as well as activities that require a lot of thinking until you see your doctor. You may need shorter work days and a lighter workload.  Avoid heavy lifting, working with machinery, driving and working at heights until your symptoms are gone or you are cleared by a provider.    Going Back to School:  If you are still having symptoms, you may need extra help at school.  Tell your teachers and school nurse about your injury and symptoms. Ask them to watch for problems with learning, memory, and concentrating. Symptoms may get worse when you do schoolwork, and you may become more irritable. You may need shorter school days, a reduced workload, and to postpone testing.  Do not drive  or take gym class (physical activity) until cleared by a provider.    Returning to Sports:  Never return to play if you have any symptoms. A full recovery will reduce the chances of getting hurt again. Remember, it is better to miss one or two games than a whole season.  You should rest from all physical activity until you see your provider. Generally, if all symptoms have completely cleared, your provider can help guide you to slowly return to sports. If symptoms return or worsen, stop the activity and see your provider.  Important: If you are in an organized sport and under age 18, you will need written consent from a healthcare provider before you return to sports. Typically, this will be your primary care or sports medicine provider. Please make an appointment.    If you were given a prescription for medicine here today, be sure to read all of the information (including the package insert) that comes with your prescription.  This will include important information about the medicine, its side effects, and any warnings that you need to know about.  The pharmacist who fills the prescription can provide more information and answer questions you may have about the medicine.  If you have questions or concerns that the pharmacist cannot address, please call or return to the Emergency Department.     Remember that you can always come back to the Emergency Department if you are not able to see your regular provider in the amount of time listed above, if you get any new symptoms, or if there is anything that worries you.'

## 2024-02-01 ENCOUNTER — HOSPITAL ENCOUNTER (EMERGENCY)
Facility: CLINIC | Age: 34
Discharge: HOME | End: 2024-02-02
Attending: SOCIAL WORKER | Admitting: SOCIAL WORKER
Payer: COMMERCIAL

## 2024-02-01 ENCOUNTER — APPOINTMENT (OUTPATIENT)
Dept: CT IMAGING | Facility: CLINIC | Age: 34
End: 2024-02-01
Attending: SOCIAL WORKER
Payer: COMMERCIAL

## 2024-02-01 DIAGNOSIS — S22.009S FRACTURE OF THORACIC SPINE, UNSPECIFIED FRACTURE MORPHOLOGY, SEQUELA: ICD-10-CM

## 2024-02-01 DIAGNOSIS — R07.81 RIB PAIN ON LEFT SIDE: ICD-10-CM

## 2024-02-01 LAB
ANION GAP SERPL CALCULATED.3IONS-SCNC: 10 MMOL/L (ref 7–15)
BASOPHILS # BLD AUTO: 0 10E3/UL (ref 0–0.2)
BASOPHILS NFR BLD AUTO: 1 %
BUN SERPL-MCNC: 15.4 MG/DL (ref 6–20)
CALCIUM SERPL-MCNC: 9.3 MG/DL (ref 8.6–10)
CHLORIDE SERPL-SCNC: 102 MMOL/L (ref 98–107)
CREAT SERPL-MCNC: 0.77 MG/DL (ref 0.67–1.17)
DEPRECATED HCO3 PLAS-SCNC: 25 MMOL/L (ref 22–29)
EGFRCR SERPLBLD CKD-EPI 2021: >90 ML/MIN/1.73M2
EOSINOPHIL # BLD AUTO: 0.1 10E3/UL (ref 0–0.7)
EOSINOPHIL NFR BLD AUTO: 2 %
ERYTHROCYTE [DISTWIDTH] IN BLOOD BY AUTOMATED COUNT: 12.2 % (ref 10–15)
GLUCOSE SERPL-MCNC: 97 MG/DL (ref 70–99)
HCT VFR BLD AUTO: 41.2 % (ref 40–53)
HGB BLD-MCNC: 14.4 G/DL (ref 13.3–17.7)
HOLD SPECIMEN: NORMAL
HOLD SPECIMEN: NORMAL
IMM GRANULOCYTES # BLD: 0 10E3/UL
IMM GRANULOCYTES NFR BLD: 0 %
LYMPHOCYTES # BLD AUTO: 1.9 10E3/UL (ref 0.8–5.3)
LYMPHOCYTES NFR BLD AUTO: 36 %
MCH RBC QN AUTO: 30.5 PG (ref 26.5–33)
MCHC RBC AUTO-ENTMCNC: 35 G/DL (ref 31.5–36.5)
MCV RBC AUTO: 87 FL (ref 78–100)
MONOCYTES # BLD AUTO: 0.3 10E3/UL (ref 0–1.3)
MONOCYTES NFR BLD AUTO: 6 %
NEUTROPHILS # BLD AUTO: 3.1 10E3/UL (ref 1.6–8.3)
NEUTROPHILS NFR BLD AUTO: 55 %
NRBC # BLD AUTO: 0 10E3/UL
NRBC BLD AUTO-RTO: 0 /100
PLATELET # BLD AUTO: 232 10E3/UL (ref 150–450)
POTASSIUM SERPL-SCNC: 3.6 MMOL/L (ref 3.4–5.3)
RBC # BLD AUTO: 4.72 10E6/UL (ref 4.4–5.9)
SODIUM SERPL-SCNC: 137 MMOL/L (ref 135–145)
TROPONIN T SERPL HS-MCNC: 7 NG/L
WBC # BLD AUTO: 5.5 10E3/UL (ref 4–11)

## 2024-02-01 PROCEDURE — 99285 EMERGENCY DEPT VISIT HI MDM: CPT | Mod: 25

## 2024-02-01 PROCEDURE — 36415 COLL VENOUS BLD VENIPUNCTURE: CPT | Performed by: EMERGENCY MEDICINE

## 2024-02-01 PROCEDURE — 71275 CT ANGIOGRAPHY CHEST: CPT

## 2024-02-01 PROCEDURE — 80048 BASIC METABOLIC PNL TOTAL CA: CPT | Performed by: SOCIAL WORKER

## 2024-02-01 PROCEDURE — 93005 ELECTROCARDIOGRAM TRACING: CPT

## 2024-02-01 PROCEDURE — 250N000013 HC RX MED GY IP 250 OP 250 PS 637: Performed by: SOCIAL WORKER

## 2024-02-01 PROCEDURE — 96374 THER/PROPH/DIAG INJ IV PUSH: CPT | Mod: 59

## 2024-02-01 PROCEDURE — 250N000011 HC RX IP 250 OP 636: Performed by: SOCIAL WORKER

## 2024-02-01 PROCEDURE — 84484 ASSAY OF TROPONIN QUANT: CPT | Performed by: SOCIAL WORKER

## 2024-02-01 PROCEDURE — 72125 CT NECK SPINE W/O DYE: CPT

## 2024-02-01 PROCEDURE — 85025 COMPLETE CBC W/AUTO DIFF WBC: CPT | Performed by: SOCIAL WORKER

## 2024-02-01 RX ORDER — IOPAMIDOL 755 MG/ML
500 INJECTION, SOLUTION INTRAVASCULAR ONCE
Status: COMPLETED | OUTPATIENT
Start: 2024-02-01 | End: 2024-02-01

## 2024-02-01 RX ORDER — LIDOCAINE 4 G/G
1 PATCH TOPICAL ONCE
Status: DISCONTINUED | OUTPATIENT
Start: 2024-02-01 | End: 2024-02-02 | Stop reason: HOSPADM

## 2024-02-01 RX ORDER — KETOROLAC TROMETHAMINE 15 MG/ML
15 INJECTION, SOLUTION INTRAMUSCULAR; INTRAVENOUS ONCE
Status: COMPLETED | OUTPATIENT
Start: 2024-02-01 | End: 2024-02-01

## 2024-02-01 RX ADMIN — IOPAMIDOL 72 ML: 755 INJECTION, SOLUTION INTRAVENOUS at 23:09

## 2024-02-01 RX ADMIN — KETOROLAC TROMETHAMINE 15 MG: 15 INJECTION, SOLUTION INTRAMUSCULAR; INTRAVENOUS at 22:47

## 2024-02-01 RX ADMIN — LIDOCAINE 1 PATCH: 4 PATCH TOPICAL at 22:47

## 2024-02-01 ASSESSMENT — ACTIVITIES OF DAILY LIVING (ADL): ADLS_ACUITY_SCORE: 35
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
## 2024-02-02 VITALS
HEART RATE: 57 BPM | SYSTOLIC BLOOD PRESSURE: 128 MMHG | RESPIRATION RATE: 18 BRPM | OXYGEN SATURATION: 99 % | TEMPERATURE: 96.8 F | DIASTOLIC BLOOD PRESSURE: 87 MMHG

## 2024-02-02 LAB
ATRIAL RATE - MUSE: 65 BPM
DIASTOLIC BLOOD PRESSURE - MUSE: NORMAL MMHG
INTERPRETATION ECG - MUSE: NORMAL
P AXIS - MUSE: 55 DEGREES
PR INTERVAL - MUSE: 154 MS
QRS DURATION - MUSE: 90 MS
QT - MUSE: 374 MS
QTC - MUSE: 388 MS
R AXIS - MUSE: 30 DEGREES
SYSTOLIC BLOOD PRESSURE - MUSE: NORMAL MMHG
T AXIS - MUSE: 33 DEGREES
VENTRICULAR RATE- MUSE: 65 BPM

## 2024-02-02 RX ORDER — LIDOCAINE 4 G/G
1 PATCH TOPICAL EVERY 24 HOURS
Qty: 7 PATCH | Refills: 0 | Status: SHIPPED | OUTPATIENT
Start: 2024-02-02 | End: 2024-02-09

## 2024-02-02 ASSESSMENT — ACTIVITIES OF DAILY LIVING (ADL): ADLS_ACUITY_SCORE: 35
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
## 2024-02-27 ENCOUNTER — APPOINTMENT (OUTPATIENT)
Dept: GENERAL RADIOLOGY | Facility: CLINIC | Age: 34
End: 2024-02-27
Attending: EMERGENCY MEDICINE
Payer: COMMERCIAL

## 2024-02-27 ENCOUNTER — HOSPITAL ENCOUNTER (EMERGENCY)
Facility: CLINIC | Age: 34
Discharge: HOME | End: 2024-02-27
Attending: EMERGENCY MEDICINE | Admitting: EMERGENCY MEDICINE
Payer: COMMERCIAL

## 2024-02-27 VITALS
RESPIRATION RATE: 18 BRPM | HEART RATE: 74 BPM | SYSTOLIC BLOOD PRESSURE: 127 MMHG | WEIGHT: 191.14 LBS | BODY MASS INDEX: 25.92 KG/M2 | TEMPERATURE: 97.8 F | OXYGEN SATURATION: 100 % | DIASTOLIC BLOOD PRESSURE: 87 MMHG

## 2024-02-27 DIAGNOSIS — R07.9 CHEST PAIN, UNSPECIFIED TYPE: ICD-10-CM

## 2024-02-27 LAB
ANION GAP SERPL CALCULATED.3IONS-SCNC: 10 MMOL/L (ref 7–15)
ATRIAL RATE - MUSE: 65 BPM
BASOPHILS # BLD AUTO: 0 10E3/UL (ref 0–0.2)
BASOPHILS NFR BLD AUTO: 1 %
BUN SERPL-MCNC: 7 MG/DL (ref 6–20)
CALCIUM SERPL-MCNC: 9.6 MG/DL (ref 8.6–10)
CHLORIDE SERPL-SCNC: 104 MMOL/L (ref 98–107)
CREAT SERPL-MCNC: 0.74 MG/DL (ref 0.67–1.17)
DEPRECATED HCO3 PLAS-SCNC: 26 MMOL/L (ref 22–29)
DIASTOLIC BLOOD PRESSURE - MUSE: NORMAL MMHG
EGFRCR SERPLBLD CKD-EPI 2021: >90 ML/MIN/1.73M2
EOSINOPHIL # BLD AUTO: 0.1 10E3/UL (ref 0–0.7)
EOSINOPHIL NFR BLD AUTO: 3 %
ERYTHROCYTE [DISTWIDTH] IN BLOOD BY AUTOMATED COUNT: 12.4 % (ref 10–15)
GLUCOSE SERPL-MCNC: 99 MG/DL (ref 70–99)
HCT VFR BLD AUTO: 45.6 % (ref 40–53)
HGB BLD-MCNC: 15.6 G/DL (ref 13.3–17.7)
IMM GRANULOCYTES # BLD: 0 10E3/UL
IMM GRANULOCYTES NFR BLD: 0 %
INTERPRETATION ECG - MUSE: NORMAL
LYMPHOCYTES # BLD AUTO: 1.4 10E3/UL (ref 0.8–5.3)
LYMPHOCYTES NFR BLD AUTO: 33 %
MCH RBC QN AUTO: 30.4 PG (ref 26.5–33)
MCHC RBC AUTO-ENTMCNC: 34.2 G/DL (ref 31.5–36.5)
MCV RBC AUTO: 89 FL (ref 78–100)
MONOCYTES # BLD AUTO: 0.2 10E3/UL (ref 0–1.3)
MONOCYTES NFR BLD AUTO: 4 %
NEUTROPHILS # BLD AUTO: 2.5 10E3/UL (ref 1.6–8.3)
NEUTROPHILS NFR BLD AUTO: 59 %
NRBC # BLD AUTO: 0 10E3/UL
NRBC BLD AUTO-RTO: 0 /100
P AXIS - MUSE: 62 DEGREES
PLATELET # BLD AUTO: 213 10E3/UL (ref 150–450)
POTASSIUM SERPL-SCNC: 4.2 MMOL/L (ref 3.4–5.3)
PR INTERVAL - MUSE: 152 MS
QRS DURATION - MUSE: 88 MS
QT - MUSE: 388 MS
QTC - MUSE: 403 MS
R AXIS - MUSE: 14 DEGREES
RBC # BLD AUTO: 5.14 10E6/UL (ref 4.4–5.9)
SODIUM SERPL-SCNC: 140 MMOL/L (ref 135–145)
SYSTOLIC BLOOD PRESSURE - MUSE: NORMAL MMHG
T AXIS - MUSE: 14 DEGREES
TROPONIN T SERPL HS-MCNC: 8 NG/L
VENTRICULAR RATE- MUSE: 65 BPM
WBC # BLD AUTO: 4.2 10E3/UL (ref 4–11)

## 2024-02-27 PROCEDURE — 84295 ASSAY OF SERUM SODIUM: CPT | Performed by: EMERGENCY MEDICINE

## 2024-02-27 PROCEDURE — 36415 COLL VENOUS BLD VENIPUNCTURE: CPT | Performed by: EMERGENCY MEDICINE

## 2024-02-27 PROCEDURE — 250N000011 HC RX IP 250 OP 636: Performed by: EMERGENCY MEDICINE

## 2024-02-27 PROCEDURE — 96372 THER/PROPH/DIAG INJ SC/IM: CPT | Performed by: EMERGENCY MEDICINE

## 2024-02-27 PROCEDURE — 99285 EMERGENCY DEPT VISIT HI MDM: CPT | Mod: 25

## 2024-02-27 PROCEDURE — 85025 COMPLETE CBC W/AUTO DIFF WBC: CPT | Performed by: EMERGENCY MEDICINE

## 2024-02-27 PROCEDURE — 84484 ASSAY OF TROPONIN QUANT: CPT | Performed by: EMERGENCY MEDICINE

## 2024-02-27 PROCEDURE — 71046 X-RAY EXAM CHEST 2 VIEWS: CPT

## 2024-02-27 PROCEDURE — 93005 ELECTROCARDIOGRAM TRACING: CPT

## 2024-02-27 PROCEDURE — 82374 ASSAY BLOOD CARBON DIOXIDE: CPT | Performed by: EMERGENCY MEDICINE

## 2024-02-27 RX ORDER — KETOROLAC TROMETHAMINE 15 MG/ML
15 INJECTION, SOLUTION INTRAMUSCULAR; INTRAVENOUS ONCE
Status: COMPLETED | OUTPATIENT
Start: 2024-02-27 | End: 2024-02-27

## 2024-02-27 RX ADMIN — KETOROLAC TROMETHAMINE 15 MG: 15 INJECTION, SOLUTION INTRAMUSCULAR; INTRAVENOUS at 10:46

## 2024-02-27 ASSESSMENT — ACTIVITIES OF DAILY LIVING (ADL)
ADLS_ACUITY_SCORE: 35

## 2024-02-27 ASSESSMENT — COLUMBIA-SUICIDE SEVERITY RATING SCALE - C-SSRS
6. HAVE YOU EVER DONE ANYTHING, STARTED TO DO ANYTHING, OR PREPARED TO DO ANYTHING TO END YOUR LIFE?: NO
2. HAVE YOU ACTUALLY HAD ANY THOUGHTS OF KILLING YOURSELF IN THE PAST MONTH?: NO
1. IN THE PAST MONTH, HAVE YOU WISHED YOU WERE DEAD OR WISHED YOU COULD GO TO SLEEP AND NOT WAKE UP?: NO
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
## 2024-02-27 NOTE — DISCHARGE INSTRUCTIONS
Blood work, chest x-ray all appear normal.  There are no signs of new fractures.  You can take Tylenol and ibuprofen for pain.  We recommend you follow-up with your primary care provider.  Please return the emergency department if you develop any new or concerning symptoms.
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
## 2024-03-04 ENCOUNTER — OFFICE VISIT (OUTPATIENT)
Dept: FAMILY MEDICINE | Facility: CLINIC | Age: 34
End: 2024-03-04
Payer: COMMERCIAL

## 2024-03-04 VITALS
BODY MASS INDEX: 25.87 KG/M2 | WEIGHT: 191 LBS | RESPIRATION RATE: 14 BRPM | HEIGHT: 72 IN | TEMPERATURE: 97.3 F | SYSTOLIC BLOOD PRESSURE: 122 MMHG | DIASTOLIC BLOOD PRESSURE: 76 MMHG | HEART RATE: 72 BPM | OXYGEN SATURATION: 100 %

## 2024-03-04 DIAGNOSIS — R07.9 CHEST PAIN, UNSPECIFIED TYPE: Primary | ICD-10-CM

## 2024-03-04 PROCEDURE — 99213 OFFICE O/P EST LOW 20 MIN: CPT | Performed by: FAMILY MEDICINE

## 2024-03-04 ASSESSMENT — PAIN SCALES - GENERAL: PAINLEVEL: NO PAIN (0)
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
## 2024-03-04 NOTE — PROGRESS NOTES
{PROVIDER CHARTING PREFERENCE:077355}    Carla Lopez is a 34 year old, presenting for the following health issues:  ER F/U        3/4/2024     2:46 PM   Additional Questions   Roomed by Jean Carlos FAEY CMA     [facility]     ED/UC Followup:    Facility:  [facility] ED  Date of visit: 02/27/2024  Reason for visit: Chest pain, unspecified type   Current Status: ***  {additonal problems for provider to add (Optional):389185}    {ROS Picklists (Optional):539592}      Objective    There were no vitals taken for this visit.  There is no height or weight on file to calculate BMI.  Physical Exam   {Exam List (Optional):164963}    {Diagnostic Test Results (Optional):854682}      Signed Electronically by: Anjum Bryan MD  {Email feedback regarding this note to [email or web address]   :[id number]}

## 2024-04-29 ENCOUNTER — APPOINTMENT (OUTPATIENT)
Dept: CT IMAGING | Facility: CLINIC | Age: 34
End: 2024-04-29
Attending: EMERGENCY MEDICINE
Payer: MEDICAID

## 2024-04-29 ENCOUNTER — APPOINTMENT (OUTPATIENT)
Dept: MRI IMAGING | Facility: CLINIC | Age: 34
End: 2024-04-29
Attending: EMERGENCY MEDICINE
Payer: MEDICAID

## 2024-04-29 ENCOUNTER — HOSPITAL ENCOUNTER (EMERGENCY)
Facility: CLINIC | Age: 34
Discharge: HOME | End: 2024-04-29
Attending: EMERGENCY MEDICINE | Admitting: EMERGENCY MEDICINE
Payer: MEDICAID

## 2024-04-29 VITALS
TEMPERATURE: 98.1 F | DIASTOLIC BLOOD PRESSURE: 84 MMHG | HEIGHT: 72 IN | RESPIRATION RATE: 18 BRPM | OXYGEN SATURATION: 99 % | SYSTOLIC BLOOD PRESSURE: 129 MMHG | BODY MASS INDEX: 24.38 KG/M2 | HEART RATE: 59 BPM | WEIGHT: 180 LBS

## 2024-04-29 DIAGNOSIS — R51.9 NONINTRACTABLE HEADACHE, UNSPECIFIED CHRONICITY PATTERN, UNSPECIFIED HEADACHE TYPE: ICD-10-CM

## 2024-04-29 DIAGNOSIS — B34.9 VIRAL SYNDROME: ICD-10-CM

## 2024-04-29 DIAGNOSIS — R52 BODY ACHES: ICD-10-CM

## 2024-04-29 DIAGNOSIS — M48.02 CERVICAL STENOSIS OF SPINAL CANAL: ICD-10-CM

## 2024-04-29 LAB
ALBUMIN SERPL BCG-MCNC: 4.7 G/DL (ref 3.5–5.2)
ALP SERPL-CCNC: 70 U/L (ref 40–150)
ALT SERPL W P-5'-P-CCNC: 34 U/L (ref 0–70)
ANION GAP SERPL CALCULATED.3IONS-SCNC: 12 MMOL/L (ref 7–15)
AST SERPL W P-5'-P-CCNC: 28 U/L (ref 0–45)
BASOPHILS # BLD AUTO: 0 10E3/UL (ref 0–0.2)
BASOPHILS NFR BLD AUTO: 1 %
BILIRUB SERPL-MCNC: 0.8 MG/DL
BUN SERPL-MCNC: 13.7 MG/DL (ref 6–20)
CALCIUM SERPL-MCNC: 9.6 MG/DL (ref 8.6–10)
CHLORIDE SERPL-SCNC: 104 MMOL/L (ref 98–107)
CREAT SERPL-MCNC: 0.77 MG/DL (ref 0.67–1.17)
DEPRECATED HCO3 PLAS-SCNC: 24 MMOL/L (ref 22–29)
EGFRCR SERPLBLD CKD-EPI 2021: >90 ML/MIN/1.73M2
EOSINOPHIL # BLD AUTO: 0.1 10E3/UL (ref 0–0.7)
EOSINOPHIL NFR BLD AUTO: 2 %
ERYTHROCYTE [DISTWIDTH] IN BLOOD BY AUTOMATED COUNT: 12.4 % (ref 10–15)
FLUAV RNA SPEC QL NAA+PROBE: NEGATIVE
FLUBV RNA RESP QL NAA+PROBE: NEGATIVE
GLUCOSE SERPL-MCNC: 89 MG/DL (ref 70–99)
HCT VFR BLD AUTO: 47.2 % (ref 40–53)
HGB BLD-MCNC: 16.4 G/DL (ref 13.3–17.7)
IMM GRANULOCYTES # BLD: 0 10E3/UL
IMM GRANULOCYTES NFR BLD: 0 %
LIPASE SERPL-CCNC: 22 U/L (ref 13–60)
LYMPHOCYTES # BLD AUTO: 2.2 10E3/UL (ref 0.8–5.3)
LYMPHOCYTES NFR BLD AUTO: 40 %
MCH RBC QN AUTO: 30.6 PG (ref 26.5–33)
MCHC RBC AUTO-ENTMCNC: 34.7 G/DL (ref 31.5–36.5)
MCV RBC AUTO: 88 FL (ref 78–100)
MONOCYTES # BLD AUTO: 0.3 10E3/UL (ref 0–1.3)
MONOCYTES NFR BLD AUTO: 5 %
NEUTROPHILS # BLD AUTO: 2.9 10E3/UL (ref 1.6–8.3)
NEUTROPHILS NFR BLD AUTO: 52 %
NRBC # BLD AUTO: 0 10E3/UL
NRBC BLD AUTO-RTO: 0 /100
PLATELET # BLD AUTO: 217 10E3/UL (ref 150–450)
POTASSIUM SERPL-SCNC: 4.2 MMOL/L (ref 3.4–5.3)
PROT SERPL-MCNC: 7.7 G/DL (ref 6.4–8.3)
RBC # BLD AUTO: 5.36 10E6/UL (ref 4.4–5.9)
RSV RNA SPEC NAA+PROBE: NEGATIVE
SARS-COV-2 RNA RESP QL NAA+PROBE: NEGATIVE
SODIUM SERPL-SCNC: 140 MMOL/L (ref 135–145)
WBC # BLD AUTO: 5.5 10E3/UL (ref 4–11)

## 2024-04-29 PROCEDURE — 99285 EMERGENCY DEPT VISIT HI MDM: CPT | Mod: 25

## 2024-04-29 PROCEDURE — 70450 CT HEAD/BRAIN W/O DYE: CPT

## 2024-04-29 PROCEDURE — 85048 AUTOMATED LEUKOCYTE COUNT: CPT | Performed by: EMERGENCY MEDICINE

## 2024-04-29 PROCEDURE — 36415 COLL VENOUS BLD VENIPUNCTURE: CPT | Performed by: EMERGENCY MEDICINE

## 2024-04-29 PROCEDURE — 72141 MRI NECK SPINE W/O DYE: CPT

## 2024-04-29 PROCEDURE — 83690 ASSAY OF LIPASE: CPT | Performed by: EMERGENCY MEDICINE

## 2024-04-29 PROCEDURE — 87637 SARSCOV2&INF A&B&RSV AMP PRB: CPT | Performed by: EMERGENCY MEDICINE

## 2024-04-29 PROCEDURE — 80053 COMPREHEN METABOLIC PANEL: CPT | Performed by: EMERGENCY MEDICINE

## 2024-04-29 ASSESSMENT — COLUMBIA-SUICIDE SEVERITY RATING SCALE - C-SSRS
1. IN THE PAST MONTH, HAVE YOU WISHED YOU WERE DEAD OR WISHED YOU COULD GO TO SLEEP AND NOT WAKE UP?: NO
2. HAVE YOU ACTUALLY HAD ANY THOUGHTS OF KILLING YOURSELF IN THE PAST MONTH?: NO
6. HAVE YOU EVER DONE ANYTHING, STARTED TO DO ANYTHING, OR PREPARED TO DO ANYTHING TO END YOUR LIFE?: NO

## 2024-04-29 ASSESSMENT — ACTIVITIES OF DAILY LIVING (ADL)
ADLS_ACUITY_SCORE: 33
ADLS_ACUITY_SCORE: 35
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
## 2024-04-29 NOTE — DISCHARGE INSTRUCTIONS
1. -Take acetaminophen 500 to 1000 mg by mouth every 4 to 6 hours as needed for pain or fever.  Do not take more than 4000 mg in 24 hours.  Do not take within 6 hours of another acetaminophen containing medication such as norco (vicodin) or percocet.  - Take ibuprofen 600 to 800 mg by mouth every 6 to 8 hours as needed for pain or fever  2.  Please return to the emergency department as needed for new or worsening symptoms including focal weakness to extremities, loss of bowel or bladder control, vomiting unable to keep anything down, severe shortness of breath, fainting, significant confusion, any other concerning symptoms.

## 2024-05-11 ENCOUNTER — HEALTH MAINTENANCE LETTER (OUTPATIENT)
Age: 34
End: 2024-05-11